# Patient Record
Sex: MALE | Race: WHITE | NOT HISPANIC OR LATINO | Employment: OTHER | URBAN - METROPOLITAN AREA
[De-identification: names, ages, dates, MRNs, and addresses within clinical notes are randomized per-mention and may not be internally consistent; named-entity substitution may affect disease eponyms.]

---

## 2017-05-16 ENCOUNTER — TRANSCRIBE ORDERS (OUTPATIENT)
Dept: ADMINISTRATIVE | Facility: HOSPITAL | Age: 82
End: 2017-05-16

## 2017-05-16 DIAGNOSIS — I63.9 IMPENDING CEREBROVASCULAR ACCIDENT (HCC): Primary | ICD-10-CM

## 2017-05-19 ENCOUNTER — HOSPITAL ENCOUNTER (OUTPATIENT)
Dept: NON INVASIVE DIAGNOSTICS | Facility: CLINIC | Age: 82
Discharge: HOME | End: 2017-05-19
Payer: MEDICARE

## 2017-05-19 DIAGNOSIS — I63.9 IMPENDING CEREBROVASCULAR ACCIDENT (HCC): ICD-10-CM

## 2017-05-19 PROCEDURE — 93880 EXTRACRANIAL BILAT STUDY: CPT

## 2017-05-23 ENCOUNTER — HOSPITAL ENCOUNTER (OUTPATIENT)
Dept: RADIOLOGY | Age: 82
Discharge: HOME | End: 2017-05-23
Payer: MEDICARE

## 2017-05-23 DIAGNOSIS — I63.9 IMPENDING CEREBROVASCULAR ACCIDENT (HCC): ICD-10-CM

## 2017-05-23 PROCEDURE — 70470 CT HEAD/BRAIN W/O & W/DYE: CPT

## 2017-05-23 RX ADMIN — IOHEXOL 100 ML: 350 INJECTION, SOLUTION INTRAVENOUS at 10:42

## 2019-02-06 ENCOUNTER — OFFICE VISIT (OUTPATIENT)
Dept: CARDIOLOGY CLINIC | Facility: CLINIC | Age: 84
End: 2019-02-06
Payer: MEDICARE

## 2019-02-06 VITALS
HEIGHT: 69 IN | BODY MASS INDEX: 24.85 KG/M2 | SYSTOLIC BLOOD PRESSURE: 130 MMHG | DIASTOLIC BLOOD PRESSURE: 80 MMHG | RESPIRATION RATE: 18 BRPM | WEIGHT: 167.8 LBS | HEART RATE: 75 BPM

## 2019-02-06 DIAGNOSIS — I10 ESSENTIAL HYPERTENSION: Primary | ICD-10-CM

## 2019-02-06 DIAGNOSIS — I49.3 VENTRICULAR PREMATURE DEPOLARIZATION: ICD-10-CM

## 2019-02-06 PROCEDURE — 99213 OFFICE O/P EST LOW 20 MIN: CPT | Performed by: INTERNAL MEDICINE

## 2019-02-06 RX ORDER — CLOPIDOGREL BISULFATE 75 MG/1
75 TABLET ORAL
COMMUNITY
Start: 2018-09-26 | End: 2019-12-17 | Stop reason: SDUPTHER

## 2019-02-06 RX ORDER — TOBRAMYCIN AND DEXAMETHASONE 3; 1 MG/ML; MG/ML
SUSPENSION/ DROPS OPHTHALMIC
Refills: 0 | COMMUNITY
Start: 2019-02-04 | End: 2019-12-17 | Stop reason: ALTCHOICE

## 2019-02-06 RX ORDER — ATENOLOL 50 MG/1
50 TABLET ORAL DAILY
COMMUNITY
Start: 2018-09-26

## 2019-02-06 RX ORDER — RANITIDINE 300 MG/1
300 TABLET ORAL
COMMUNITY
Start: 2018-05-18 | End: 2022-07-28

## 2019-02-06 RX ORDER — TAMSULOSIN HYDROCHLORIDE 0.4 MG/1
0.4 CAPSULE ORAL
COMMUNITY
Start: 2016-02-29 | End: 2022-08-02

## 2019-02-06 NOTE — LETTER
February 6, 2019     Victorino Harp MD  [address] [place] [address],[address] [facility]    Patient: Shawnee Gaston   YOB: 1927   Date of Visit: 2/6/2019       Dear Dr Pablo Pitts:    Thank you for referring Philippe Campbell to me for evaluation  Below are my notes for this consultation  If you have questions, please do not hesitate to call me  I look forward to following your patient along with you  Sincerely,        Xochitl Olivier MD        CC: No Recipients  Xochitl Olivier MD  2/6/2019 12:08 PM  Sign at close encounter  Assessment/Plan:    Essential hypertension  Hypertension, stable  We will continue the same medications  Ventricular premature depolarization  Patient has asymptomatic cardiac arrhythmia in the form of premature ventricular contractions and premature atrial contractions  Both are stable  We will continue atenolol  Diagnoses and all orders for this visit:    Essential hypertension    Ventricular premature depolarization    Other orders  -     atenolol (TENORMIN) 50 mg tablet; Take 50 mg by mouth  -     metFORMIN (GLUCOPHAGE) 500 mg tablet; Take 500 mg by mouth  -     clopidogrel (PLAVIX) 75 mg tablet; Take 75 mg by mouth  -     tamsulosin (FLOMAX) 0 4 mg; 0 8 mg  -     ranitidine (ZANTAC) 300 MG tablet; Take 300 mg by mouth  -     tobramycin-dexamethasone (TOBRADEX) ophthalmic suspension; INSTILL 1 DROP 4 TIMES A DAY IN BOTH EYES FOR 1 WEEK, THEN 1 DROP  Rhett Goody    (REFER TO PRESCRIPTION NOTES)  Subjective:  Feels well from the cardiac standpoint  Patient ID: Shawnee Gaston is a 80 y o  male  The patient presented to this office for the purpose of cardiac follow-up  He has a history of prior stroke as well as hypertension and diabetes mellitus  He is also known to have cardiac arrhythmia in the form of PACs and PVCs    He is feeling rather well from the cardiac standpoint denying any symptoms of chest pain, shortness of breath, palpitation, dizziness or lightheadedness  He has no leg edema  The following portions of the patient's history were reviewed and updated as appropriate: allergies, current medications, past family history, past medical history, past social history, past surgical history and problem list     Review of Systems   Respiratory: Negative for apnea, cough, chest tightness, shortness of breath and wheezing  Cardiovascular: Negative for chest pain, palpitations and leg swelling  Gastrointestinal: Negative for abdominal pain  Neurological: Negative for dizziness and light-headedness  Objective:  Stable cardiac-wise  /80 (BP Location: Right arm, Patient Position: Sitting)   Pulse 75   Resp 18   Ht 5' 9" (1 753 m)   Wt 76 1 kg (167 lb 12 8 oz)   BMI 24 78 kg/m²           Physical Exam   Constitutional: He is oriented to person, place, and time  He appears well-developed and well-nourished  No distress  HENT:   Head: Normocephalic  Eyes: Pupils are equal, round, and reactive to light  Neck: Normal range of motion  No JVD present  No thyromegaly present  Cardiovascular: Normal rate, regular rhythm, S1 normal and S2 normal   Exam reveals no gallop and no friction rub  Murmur heard  Crescendo systolic murmur is present with a grade of 2/6   Pulmonary/Chest: Effort normal and breath sounds normal  No respiratory distress  He has no wheezes  He has no rales  He exhibits no tenderness  Abdominal: Soft  Musculoskeletal: Normal range of motion  He exhibits no edema, tenderness or deformity  Neurological: He is alert and oriented to person, place, and time  Skin: Skin is warm and dry  He is not diaphoretic  Psychiatric: He has a normal mood and affect  Vitals reviewed

## 2019-02-06 NOTE — ASSESSMENT & PLAN NOTE
Patient has asymptomatic cardiac arrhythmia in the form of premature ventricular contractions and premature atrial contractions  Both are stable  We will continue atenolol

## 2019-02-06 NOTE — PROGRESS NOTES
Assessment/Plan:    Essential hypertension  Hypertension, stable  We will continue the same medications  Ventricular premature depolarization  Patient has asymptomatic cardiac arrhythmia in the form of premature ventricular contractions and premature atrial contractions  Both are stable  We will continue atenolol  Diagnoses and all orders for this visit:    Essential hypertension    Ventricular premature depolarization    Other orders  -     atenolol (TENORMIN) 50 mg tablet; Take 50 mg by mouth  -     metFORMIN (GLUCOPHAGE) 500 mg tablet; Take 500 mg by mouth  -     clopidogrel (PLAVIX) 75 mg tablet; Take 75 mg by mouth  -     tamsulosin (FLOMAX) 0 4 mg; 0 8 mg  -     ranitidine (ZANTAC) 300 MG tablet; Take 300 mg by mouth  -     tobramycin-dexamethasone (TOBRADEX) ophthalmic suspension; INSTILL 1 DROP 4 TIMES A DAY IN BOTH EYES FOR 1 WEEK, THEN 1 DROP  Kailee Lacy    (REFER TO PRESCRIPTION NOTES)  Subjective:  Feels well from the cardiac standpoint  Patient ID: Gurmeet Brooks is a 80 y o  male  The patient presented to this office for the purpose of cardiac follow-up  He has a history of prior stroke as well as hypertension and diabetes mellitus  He is also known to have cardiac arrhythmia in the form of PACs and PVCs  He is feeling rather well from the cardiac standpoint denying any symptoms of chest pain, shortness of breath, palpitation, dizziness or lightheadedness  He has no leg edema  The following portions of the patient's history were reviewed and updated as appropriate: allergies, current medications, past family history, past medical history, past social history, past surgical history and problem list     Review of Systems   Respiratory: Negative for apnea, cough, chest tightness, shortness of breath and wheezing  Cardiovascular: Negative for chest pain, palpitations and leg swelling  Gastrointestinal: Negative for abdominal pain     Neurological: Negative for dizziness and light-headedness  Objective:  Stable cardiac-wise  /80 (BP Location: Right arm, Patient Position: Sitting)   Pulse 75   Resp 18   Ht 5' 9" (1 753 m)   Wt 76 1 kg (167 lb 12 8 oz)   BMI 24 78 kg/m²          Physical Exam   Constitutional: He is oriented to person, place, and time  He appears well-developed and well-nourished  No distress  HENT:   Head: Normocephalic  Eyes: Pupils are equal, round, and reactive to light  Neck: Normal range of motion  No JVD present  No thyromegaly present  Cardiovascular: Normal rate, regular rhythm, S1 normal and S2 normal   Exam reveals no gallop and no friction rub  Murmur heard  Crescendo systolic murmur is present with a grade of 2/6   Pulmonary/Chest: Effort normal and breath sounds normal  No respiratory distress  He has no wheezes  He has no rales  He exhibits no tenderness  Abdominal: Soft  Musculoskeletal: Normal range of motion  He exhibits no edema, tenderness or deformity  Neurological: He is alert and oriented to person, place, and time  Skin: Skin is warm and dry  He is not diaphoretic  Psychiatric: He has a normal mood and affect  Vitals reviewed

## 2019-02-26 PROBLEM — H61.23 BILATERAL IMPACTED CERUMEN: Status: ACTIVE | Noted: 2019-02-26

## 2019-06-19 ENCOUNTER — OFFICE VISIT (OUTPATIENT)
Dept: CARDIOLOGY CLINIC | Facility: CLINIC | Age: 84
End: 2019-06-19
Payer: MEDICARE

## 2019-06-19 VITALS
HEIGHT: 69 IN | DIASTOLIC BLOOD PRESSURE: 75 MMHG | HEART RATE: 70 BPM | BODY MASS INDEX: 25.42 KG/M2 | SYSTOLIC BLOOD PRESSURE: 130 MMHG | WEIGHT: 171.6 LBS | RESPIRATION RATE: 18 BRPM

## 2019-06-19 DIAGNOSIS — I49.3 VENTRICULAR PREMATURE DEPOLARIZATION: ICD-10-CM

## 2019-06-19 DIAGNOSIS — I10 ESSENTIAL HYPERTENSION: Primary | ICD-10-CM

## 2019-06-19 PROCEDURE — 99213 OFFICE O/P EST LOW 20 MIN: CPT | Performed by: INTERNAL MEDICINE

## 2019-08-06 PROBLEM — H61.23 BILATERAL IMPACTED CERUMEN: Status: RESOLVED | Noted: 2019-02-26 | Resolved: 2019-08-06

## 2019-12-17 ENCOUNTER — OFFICE VISIT (OUTPATIENT)
Dept: CARDIOLOGY CLINIC | Facility: CLINIC | Age: 84
End: 2019-12-17
Payer: MEDICARE

## 2019-12-17 VITALS
HEIGHT: 69 IN | WEIGHT: 173.4 LBS | RESPIRATION RATE: 18 BRPM | SYSTOLIC BLOOD PRESSURE: 130 MMHG | DIASTOLIC BLOOD PRESSURE: 80 MMHG | BODY MASS INDEX: 25.68 KG/M2 | HEART RATE: 70 BPM | OXYGEN SATURATION: 97 %

## 2019-12-17 DIAGNOSIS — I10 ESSENTIAL HYPERTENSION: Primary | ICD-10-CM

## 2019-12-17 DIAGNOSIS — I49.3 VENTRICULAR PREMATURE DEPOLARIZATION: ICD-10-CM

## 2019-12-17 PROCEDURE — 99213 OFFICE O/P EST LOW 20 MIN: CPT | Performed by: INTERNAL MEDICINE

## 2019-12-17 RX ORDER — FINASTERIDE 5 MG/1
5 TABLET, FILM COATED ORAL DAILY
COMMUNITY
Start: 2019-10-24

## 2019-12-17 RX ORDER — CLOPIDOGREL BISULFATE 75 MG/1
TABLET ORAL
COMMUNITY
Start: 2019-11-16 | End: 2022-07-28

## 2019-12-17 NOTE — LETTER
December 17, 2019     Lynette Steen MD  [address] [place] [address]    Patient: Delaney Meng   YOB: 1927   Date of Visit: 12/17/2019       Dear Dr Jean Crowder:    Thank you for referring Coty Kruse to me for evaluation  Below are my notes for this consultation  If you have questions, please do not hesitate to call me  I look forward to following your patient along with you  Sincerely,        Chris Zapata MD        CC: No Recipients  Chris Zapata MD  12/17/2019  9:35 AM  Sign at close encounter  Assessment/Plan:    Essential hypertension  Hypertension, stable and adequately controlled  Ventricular premature depolarization  History of premature ventricular contractions  This has been stable  The patient is asymptomatic  We will continue observation  Diagnoses and all orders for this visit:    Essential hypertension    Ventricular premature depolarization    Other orders  -     clopidogrel (PLAVIX) 75 mg tablet  -     finasteride (PROSCAR) 5 mg tablet          Subjective:  Feels well  Patient ID: Delaney Meng is a 80 y o  male  The patient presented to this office for the purpose of cardiac follow-up  He has a known history of hypertension and premature ventricular contractions  He had a prior history of CVA  He is somewhat unsteady on his feet but he has managed to avoid falling  He is using an assistive device  The patient denies any symptom of chest pain, shortness of breath, palpitation, dizziness or lightheadedness  He has no leg edema  The following portions of the patient's history were reviewed and updated as appropriate: allergies, current medications, past family history, past medical history, past social history, past surgical history and problem list     Review of Systems   Respiratory: Negative for apnea, cough, chest tightness, shortness of breath and wheezing      Cardiovascular: Negative for chest pain, palpitations and leg swelling  Gastrointestinal: Negative for abdominal pain  Neurological: Negative for dizziness and light-headedness  Hematological: Negative  Psychiatric/Behavioral: Negative  Objective:  Stable cardiac-wise  /80 (BP Location: Right arm, Patient Position: Sitting)   Pulse 70   Resp 18   Ht 5' 9" (1 753 m)   Wt 78 7 kg (173 lb 6 4 oz)   SpO2 97%   BMI 25 61 kg/m²           Physical Exam   Constitutional: He is oriented to person, place, and time  He appears well-developed and well-nourished  No distress  HENT:   Head: Normocephalic  Eyes: Pupils are equal, round, and reactive to light  Neck: Normal range of motion  No JVD present  No thyromegaly present  Cardiovascular: Normal rate, regular rhythm, S1 normal and S2 normal  Exam reveals no gallop and no friction rub  Murmur heard  Crescendo systolic murmur is present with a grade of 2/6  Pulmonary/Chest: Effort normal and breath sounds normal  No respiratory distress  He has no wheezes  He has no rales  He exhibits no tenderness  Abdominal: Soft  Musculoskeletal: Normal range of motion  He exhibits no edema, tenderness or deformity  Neurological: He is alert and oriented to person, place, and time  Skin: Skin is warm and dry  He is not diaphoretic  Psychiatric: He has a normal mood and affect  Vitals reviewed

## 2019-12-17 NOTE — ASSESSMENT & PLAN NOTE
History of premature ventricular contractions  This has been stable  The patient is asymptomatic  We will continue observation

## 2019-12-17 NOTE — PROGRESS NOTES
Assessment/Plan:    Essential hypertension  Hypertension, stable and adequately controlled  Ventricular premature depolarization  History of premature ventricular contractions  This has been stable  The patient is asymptomatic  We will continue observation  Diagnoses and all orders for this visit:    Essential hypertension    Ventricular premature depolarization    Other orders  -     clopidogrel (PLAVIX) 75 mg tablet  -     finasteride (PROSCAR) 5 mg tablet          Subjective:  Feels well  Patient ID: Delvis Grullon is a 80 y o  male  The patient presented to this office for the purpose of cardiac follow-up  He has a known history of hypertension and premature ventricular contractions  He had a prior history of CVA  He is somewhat unsteady on his feet but he has managed to avoid falling  He is using an assistive device  The patient denies any symptom of chest pain, shortness of breath, palpitation, dizziness or lightheadedness  He has no leg edema  The following portions of the patient's history were reviewed and updated as appropriate: allergies, current medications, past family history, past medical history, past social history, past surgical history and problem list     Review of Systems   Respiratory: Negative for apnea, cough, chest tightness, shortness of breath and wheezing  Cardiovascular: Negative for chest pain, palpitations and leg swelling  Gastrointestinal: Negative for abdominal pain  Neurological: Negative for dizziness and light-headedness  Hematological: Negative  Psychiatric/Behavioral: Negative  Objective:  Stable cardiac-wise  /80 (BP Location: Right arm, Patient Position: Sitting)   Pulse 70   Resp 18   Ht 5' 9" (1 753 m)   Wt 78 7 kg (173 lb 6 4 oz)   SpO2 97%   BMI 25 61 kg/m²          Physical Exam   Constitutional: He is oriented to person, place, and time  He appears well-developed and well-nourished  No distress     HENT: Head: Normocephalic  Eyes: Pupils are equal, round, and reactive to light  Neck: Normal range of motion  No JVD present  No thyromegaly present  Cardiovascular: Normal rate, regular rhythm, S1 normal and S2 normal  Exam reveals no gallop and no friction rub  Murmur heard  Crescendo systolic murmur is present with a grade of 2/6  Pulmonary/Chest: Effort normal and breath sounds normal  No respiratory distress  He has no wheezes  He has no rales  He exhibits no tenderness  Abdominal: Soft  Musculoskeletal: Normal range of motion  He exhibits no edema, tenderness or deformity  Neurological: He is alert and oriented to person, place, and time  Skin: Skin is warm and dry  He is not diaphoretic  Psychiatric: He has a normal mood and affect  Vitals reviewed

## 2020-05-01 ENCOUNTER — TELEPHONE (OUTPATIENT)
Dept: UROLOGY | Facility: AMBULATORY SURGERY CENTER | Age: 85
End: 2020-05-01

## 2020-05-12 ENCOUNTER — NURSE TRIAGE (OUTPATIENT)
Dept: OTHER | Facility: OTHER | Age: 85
End: 2020-05-12

## 2020-06-23 ENCOUNTER — OFFICE VISIT (OUTPATIENT)
Dept: CARDIOLOGY CLINIC | Facility: CLINIC | Age: 85
End: 2020-06-23
Payer: MEDICARE

## 2020-06-23 VITALS
HEART RATE: 70 BPM | BODY MASS INDEX: 24.65 KG/M2 | RESPIRATION RATE: 18 BRPM | SYSTOLIC BLOOD PRESSURE: 135 MMHG | OXYGEN SATURATION: 98 % | HEIGHT: 69 IN | DIASTOLIC BLOOD PRESSURE: 80 MMHG | WEIGHT: 166.4 LBS

## 2020-06-23 DIAGNOSIS — I10 ESSENTIAL HYPERTENSION: Primary | ICD-10-CM

## 2020-06-23 DIAGNOSIS — I49.3 VENTRICULAR PREMATURE DEPOLARIZATION: ICD-10-CM

## 2020-06-23 PROCEDURE — 99213 OFFICE O/P EST LOW 20 MIN: CPT | Performed by: INTERNAL MEDICINE

## 2021-01-23 ENCOUNTER — IMMUNIZATIONS (OUTPATIENT)
Dept: FAMILY MEDICINE CLINIC | Facility: HOSPITAL | Age: 86
End: 2021-01-23

## 2021-01-23 DIAGNOSIS — Z23 ENCOUNTER FOR IMMUNIZATION: Primary | ICD-10-CM

## 2021-01-23 PROCEDURE — 0001A SARS-COV-2 / COVID-19 MRNA VACCINE (PFIZER-BIONTECH) 30 MCG: CPT

## 2021-01-23 PROCEDURE — 91300 SARS-COV-2 / COVID-19 MRNA VACCINE (PFIZER-BIONTECH) 30 MCG: CPT

## 2021-02-13 ENCOUNTER — IMMUNIZATIONS (OUTPATIENT)
Dept: FAMILY MEDICINE CLINIC | Facility: HOSPITAL | Age: 86
End: 2021-02-13

## 2021-02-13 DIAGNOSIS — Z23 ENCOUNTER FOR IMMUNIZATION: Primary | ICD-10-CM

## 2021-02-13 PROCEDURE — 0002A SARS-COV-2 / COVID-19 MRNA VACCINE (PFIZER-BIONTECH) 30 MCG: CPT

## 2021-02-13 PROCEDURE — 91300 SARS-COV-2 / COVID-19 MRNA VACCINE (PFIZER-BIONTECH) 30 MCG: CPT

## 2021-04-15 ENCOUNTER — LAB REQUISITION (OUTPATIENT)
Dept: LAB | Facility: HOSPITAL | Age: 86
End: 2021-04-15
Payer: MEDICARE

## 2021-04-15 DIAGNOSIS — N40.1 BENIGN PROSTATIC HYPERPLASIA WITH LOWER URINARY TRACT SYMPTOMS: ICD-10-CM

## 2021-04-15 DIAGNOSIS — N28.1 CYST OF KIDNEY, ACQUIRED: ICD-10-CM

## 2021-04-15 DIAGNOSIS — R31.9 HEMATURIA, UNSPECIFIED: ICD-10-CM

## 2021-04-15 PROCEDURE — 88112 CYTOPATH CELL ENHANCE TECH: CPT | Performed by: PATHOLOGY

## 2021-04-15 PROCEDURE — 87086 URINE CULTURE/COLONY COUNT: CPT | Performed by: UROLOGY

## 2021-04-17 LAB — BACTERIA UR CULT: NORMAL

## 2021-09-28 ENCOUNTER — IMMUNIZATIONS (OUTPATIENT)
Dept: FAMILY MEDICINE CLINIC | Facility: HOSPITAL | Age: 86
End: 2021-09-28

## 2021-09-28 DIAGNOSIS — Z23 ENCOUNTER FOR IMMUNIZATION: Primary | ICD-10-CM

## 2021-09-28 PROCEDURE — 91300 SARS-COV-2 / COVID-19 MRNA VACCINE (PFIZER-BIONTECH) 30 MCG: CPT

## 2021-09-28 PROCEDURE — 0001A SARS-COV-2 / COVID-19 MRNA VACCINE (PFIZER-BIONTECH) 30 MCG: CPT

## 2022-02-09 PROCEDURE — 87186 SC STD MICRODIL/AGAR DIL: CPT | Performed by: UROLOGY

## 2022-02-09 PROCEDURE — 87077 CULTURE AEROBIC IDENTIFY: CPT | Performed by: UROLOGY

## 2022-02-09 PROCEDURE — 87086 URINE CULTURE/COLONY COUNT: CPT | Performed by: UROLOGY

## 2022-02-10 ENCOUNTER — LAB REQUISITION (OUTPATIENT)
Dept: LAB | Facility: HOSPITAL | Age: 87
End: 2022-02-10
Payer: MEDICARE

## 2022-02-10 DIAGNOSIS — N39.0 URINARY TRACT INFECTION, SITE NOT SPECIFIED: ICD-10-CM

## 2022-02-12 LAB
BACTERIA UR CULT: ABNORMAL
BACTERIA UR CULT: ABNORMAL

## 2022-05-31 ENCOUNTER — APPOINTMENT (OUTPATIENT)
Dept: LAB | Facility: HOSPITAL | Age: 87
End: 2022-05-31
Attending: UROLOGY
Payer: MEDICARE

## 2022-05-31 DIAGNOSIS — N39.0 URINARY TRACT INFECTION WITHOUT HEMATURIA, SITE UNSPECIFIED: ICD-10-CM

## 2022-05-31 PROCEDURE — 87077 CULTURE AEROBIC IDENTIFY: CPT

## 2022-05-31 PROCEDURE — 87186 SC STD MICRODIL/AGAR DIL: CPT

## 2022-05-31 PROCEDURE — 87086 URINE CULTURE/COLONY COUNT: CPT

## 2022-06-01 ENCOUNTER — HOSPITAL ENCOUNTER (OUTPATIENT)
Dept: RADIOLOGY | Facility: HOSPITAL | Age: 87
Discharge: HOME | End: 2022-06-01
Attending: UROLOGY

## 2022-06-01 DIAGNOSIS — N13.8 ENLARGED PROSTATE WITH URINARY OBSTRUCTION: ICD-10-CM

## 2022-06-01 DIAGNOSIS — N40.1 ENLARGED PROSTATE WITH URINARY OBSTRUCTION: ICD-10-CM

## 2022-06-01 DIAGNOSIS — N28.1 ACQUIRED CYST OF KIDNEY: ICD-10-CM

## 2022-06-02 LAB
BACTERIA UR CULT: ABNORMAL
BACTERIA UR CULT: ABNORMAL

## 2022-06-06 ENCOUNTER — TELEPHONE (OUTPATIENT)
Dept: UROLOGY | Facility: AMBULATORY SURGERY CENTER | Age: 87
End: 2022-06-06

## 2022-06-06 ENCOUNTER — HOSPITAL ENCOUNTER (OUTPATIENT)
Dept: RADIOLOGY | Age: 87
Discharge: HOME | End: 2022-06-06
Attending: UROLOGY
Payer: MEDICARE

## 2022-06-06 PROCEDURE — 76770 US EXAM ABDO BACK WALL COMP: CPT

## 2022-06-06 NOTE — TELEPHONE ENCOUNTER
Radiology Test Results - Radiology Calling with report update    Pt under care of: Dr Ivan Martinez Findings - Please review      Radiology stated Dr Jose Armando Beyer is out of office today and to forward calls to [facility] Urology    Call back-Sherie from Radiology [phone] option 3

## 2022-06-06 NOTE — TELEPHONE ENCOUNTER
Hydronephrosis, BPH, and bladder nodule noted on imaging  Unable to review records of patient's urologic history  Unsure of next appointment with Dr Dennis Rolle  Please call patient to follow up

## 2022-06-09 ENCOUNTER — APPOINTMENT (OUTPATIENT)
Dept: LAB | Facility: HOSPITAL | Age: 87
End: 2022-06-09
Attending: UROLOGY
Payer: MEDICARE

## 2022-06-09 DIAGNOSIS — N40.1 ENLARGED PROSTATE WITH URINARY OBSTRUCTION: ICD-10-CM

## 2022-06-09 DIAGNOSIS — N13.8 ENLARGED PROSTATE WITH URINARY OBSTRUCTION: ICD-10-CM

## 2022-06-09 DIAGNOSIS — C61 MALIGNANT NEOPLASM OF PROSTATE (HCC): ICD-10-CM

## 2022-06-09 PROCEDURE — 87086 URINE CULTURE/COLONY COUNT: CPT

## 2022-06-09 PROCEDURE — 87077 CULTURE AEROBIC IDENTIFY: CPT

## 2022-06-09 PROCEDURE — 87186 SC STD MICRODIL/AGAR DIL: CPT

## 2022-06-11 LAB
BACTERIA UR CULT: ABNORMAL

## 2022-06-22 ENCOUNTER — HOSPITAL ENCOUNTER (OUTPATIENT)
Dept: RADIOLOGY | Age: 87
Discharge: HOME | End: 2022-06-22
Payer: MEDICARE

## 2022-06-22 DIAGNOSIS — N13.30 HYDRONEPHROSIS, UNSPECIFIED HYDRONEPHROSIS TYPE: ICD-10-CM

## 2022-06-22 PROCEDURE — 76770 US EXAM ABDO BACK WALL COMP: CPT

## 2022-06-27 ENCOUNTER — TELEPHONE (OUTPATIENT)
Dept: OTHER | Facility: OTHER | Age: 87
End: 2022-06-27

## 2022-06-27 NOTE — TELEPHONE ENCOUNTER
Pt's wife called in stating pt pulled his catheter out partially and it's not functioning  She is requesting a call back

## 2022-06-27 NOTE — TELEPHONE ENCOUNTER
Returned call to patient wife advised they should take him to Emergency Department for evaluation  Patient wife states they are there now and thanked our office for the call back

## 2022-07-19 ENCOUNTER — APPOINTMENT (OUTPATIENT)
Dept: RADIOLOGY | Age: 87
End: 2022-07-19
Payer: MEDICARE

## 2022-07-19 ENCOUNTER — TRANSCRIBE ORDERS (OUTPATIENT)
Dept: ADMINISTRATIVE | Age: 87
End: 2022-07-19
Payer: MEDICARE

## 2022-07-19 ENCOUNTER — APPOINTMENT (OUTPATIENT)
Dept: LAB | Age: 87
End: 2022-07-19
Payer: MEDICARE

## 2022-07-19 DIAGNOSIS — C67.9 MALIGNANT NEOPLASM OF URINARY BLADDER, UNSPECIFIED SITE (HCC): ICD-10-CM

## 2022-07-19 DIAGNOSIS — C67.9 MALIGNANT NEOPLASM OF URINARY BLADDER, UNSPECIFIED SITE (HCC): Primary | ICD-10-CM

## 2022-07-19 LAB
ABO GROUP BLD: NORMAL
ALBUMIN SERPL BCP-MCNC: 3.3 G/DL (ref 3.5–5)
ALP SERPL-CCNC: 137 U/L (ref 46–116)
ALT SERPL W P-5'-P-CCNC: 48 U/L (ref 12–78)
ANION GAP SERPL CALCULATED.3IONS-SCNC: 7 MMOL/L (ref 4–13)
AST SERPL W P-5'-P-CCNC: 31 U/L (ref 5–45)
BASOPHILS # BLD AUTO: 0.12 THOUSANDS/ΜL (ref 0–0.1)
BASOPHILS NFR BLD AUTO: 1 % (ref 0–1)
BILIRUB SERPL-MCNC: 0.32 MG/DL (ref 0.2–1)
BLD GP AB SCN SERPL QL: NEGATIVE
BUN SERPL-MCNC: 27 MG/DL (ref 5–25)
CALCIUM ALBUM COR SERPL-MCNC: 9.8 MG/DL (ref 8.3–10.1)
CALCIUM SERPL-MCNC: 9.2 MG/DL (ref 8.3–10.1)
CHLORIDE SERPL-SCNC: 103 MMOL/L (ref 96–108)
CO2 SERPL-SCNC: 24 MMOL/L (ref 21–32)
CREAT SERPL-MCNC: 1.63 MG/DL (ref 0.6–1.3)
EOSINOPHIL # BLD AUTO: 0.13 THOUSAND/ΜL (ref 0–0.61)
EOSINOPHIL NFR BLD AUTO: 1 % (ref 0–6)
ERYTHROCYTE [DISTWIDTH] IN BLOOD BY AUTOMATED COUNT: 17.9 % (ref 11.6–15.1)
GFR SERPL CREATININE-BSD FRML MDRD: 35 ML/MIN/1.73SQ M
GLUCOSE SERPL-MCNC: 338 MG/DL (ref 65–140)
HCT VFR BLD AUTO: 37.8 % (ref 36.5–49.3)
HGB BLD-MCNC: 11.3 G/DL (ref 12–17)
IMM GRANULOCYTES # BLD AUTO: 0.09 THOUSAND/UL (ref 0–0.2)
IMM GRANULOCYTES NFR BLD AUTO: 1 % (ref 0–2)
LYMPHOCYTES # BLD AUTO: 2.17 THOUSANDS/ΜL (ref 0.6–4.47)
LYMPHOCYTES NFR BLD AUTO: 22 % (ref 14–44)
MCH RBC QN AUTO: 24.4 PG (ref 26.8–34.3)
MCHC RBC AUTO-ENTMCNC: 29.9 G/DL (ref 31.4–37.4)
MCV RBC AUTO: 82 FL (ref 82–98)
MONOCYTES # BLD AUTO: 0.47 THOUSAND/ΜL (ref 0.17–1.22)
MONOCYTES NFR BLD AUTO: 5 % (ref 4–12)
NEUTROPHILS # BLD AUTO: 6.73 THOUSANDS/ΜL (ref 1.85–7.62)
NEUTS SEG NFR BLD AUTO: 70 % (ref 43–75)
NRBC BLD AUTO-RTO: 0 /100 WBCS
PLATELET # BLD AUTO: 174 THOUSANDS/UL (ref 149–390)
PMV BLD AUTO: 11.1 FL (ref 8.9–12.7)
POTASSIUM SERPL-SCNC: 4.4 MMOL/L (ref 3.5–5.3)
PROT SERPL-MCNC: 7.5 G/DL (ref 6.4–8.4)
RBC # BLD AUTO: 4.64 MILLION/UL (ref 3.88–5.62)
RH BLD: POSITIVE
SODIUM SERPL-SCNC: 134 MMOL/L (ref 135–147)
SPECIMEN EXPIRATION DATE: NORMAL
WBC # BLD AUTO: 9.71 THOUSAND/UL (ref 4.31–10.16)

## 2022-07-19 PROCEDURE — 80053 COMPREHEN METABOLIC PANEL: CPT

## 2022-07-19 PROCEDURE — 71046 X-RAY EXAM CHEST 2 VIEWS: CPT

## 2022-07-19 PROCEDURE — 36415 COLL VENOUS BLD VENIPUNCTURE: CPT

## 2022-07-19 PROCEDURE — 93005 ELECTROCARDIOGRAM TRACING: CPT

## 2022-07-19 PROCEDURE — 86850 RBC ANTIBODY SCREEN: CPT

## 2022-07-19 PROCEDURE — 85025 COMPLETE CBC W/AUTO DIFF WBC: CPT

## 2022-07-19 PROCEDURE — 86901 BLOOD TYPING SEROLOGIC RH(D): CPT

## 2022-07-19 PROCEDURE — 86900 BLOOD TYPING SEROLOGIC ABO: CPT

## 2022-07-20 LAB
ATRIAL RATE: 71 BPM
P AXIS: 47 DEGREES
PR INTERVAL: 250 MS
QRS AXIS: 0 DEGREES
QRSD INTERVAL: 82 MS
QT INTERVAL: 402 MS
QTC INTERVAL: 436 MS
T WAVE AXIS: 41 DEGREES
VENTRICULAR RATE: 71 BPM

## 2022-07-20 PROCEDURE — 93010 ELECTROCARDIOGRAM REPORT: CPT | Performed by: HOSPITALIST

## 2022-07-22 ENCOUNTER — HOSPITAL ENCOUNTER (EMERGENCY)
Facility: HOSPITAL | Age: 87
Discharge: HOME | End: 2022-07-22
Attending: EMERGENCY MEDICINE | Admitting: EMERGENCY MEDICINE
Payer: MEDICARE

## 2022-07-22 VITALS
OXYGEN SATURATION: 96 % | RESPIRATION RATE: 18 BRPM | HEART RATE: 83 BPM | SYSTOLIC BLOOD PRESSURE: 126 MMHG | DIASTOLIC BLOOD PRESSURE: 56 MMHG | TEMPERATURE: 98.2 F

## 2022-07-22 DIAGNOSIS — R33.9 URINARY RETENTION: Primary | ICD-10-CM

## 2022-07-22 PROCEDURE — 99283 EMERGENCY DEPT VISIT LOW MDM: CPT

## 2022-07-22 PROCEDURE — 99284 EMERGENCY DEPT VISIT MOD MDM: CPT | Performed by: EMERGENCY MEDICINE

## 2022-07-22 NOTE — ED NOTES
Pt arrived with 16Fr catheter in place with minimal drainage in bag  15 cc removed from balloon and catheter removed  New catheter placed as documented        [id number] Mohit Williamson RN  07/22/22 6099

## 2022-07-22 NOTE — ED PROVIDER NOTES
History  Chief Complaint   Patient presents with    Difficulty Urinating     The patient reports no output from his chronic indwelling Kaplan catheter for about 5 hours  He has lower abdominal fullness and pain  He has had identical symptoms numerous times in the past due to obstructions of his urinary catheter  He does not have any supplies to drain it  He denies fevers or chills  He has chronic urethral burning that is unchanged  He also has chronic UTI that his management team has advised should not be treated unless he has significant symptoms  The patient's pain is severe and constant  His gradually been getting worse  No exacerbating or alleviating factors  Prior to Admission Medications   Prescriptions Last Dose Informant Patient Reported? Taking?   atenolol (TENORMIN) 50 mg tablet   Yes No   Sig: Take 50 mg by mouth   clopidogrel (PLAVIX) 75 mg tablet   Yes No   finasteride (PROSCAR) 5 mg tablet   Yes No   metFORMIN (GLUCOPHAGE) 500 mg tablet   Yes No   Sig: Take 500 mg by mouth 2 (two) times a day with meals    ranitidine (ZANTAC) 300 MG tablet   Yes No   Sig: Take 300 mg by mouth   sitaGLIPtin (JANUVIA) 50 mg tablet  Self Yes No   Sig: Take 50 mg by mouth daily   tamsulosin (FLOMAX) 0 4 mg   Yes No   Si 4 mg       Facility-Administered Medications: None       Past Medical History:   Diagnosis Date    Arrhythmia     Bilateral impacted cerumen 2019    CVA [facility]ce[facility]bral vascular accident) ([facility] [address]     [facility]     Hypertension     Murmur     Prostate disease     Stomach ulcer     Unsteadiness     Vertigo        Past Surgical History:   Procedure Laterality Date    CATARACT EXTRACTION, BILATERAL      SKIN CANCER EXCISION         Family History   Problem Relation Age of Onset    Heart failure Mother     Cancer Father      I have reviewed and agree with the history as documented      E-Cigarette/Vaping     E-Cigarette/Vaping Substances     Social History     Tobacco Use    Smoking status: Never Smoker    Smokeless tobacco: Never Used   Substance Use Topics    Alcohol use: No    Drug use: No       Review of Systems   All other systems reviewed and are negative  Physical Exam  Physical Exam  Vitals and nursing note reviewed  Constitutional:       Appearance: He is well-developed  HENT:      Head: Normocephalic and atraumatic  Eyes:      Conjunctiva/sclera: Conjunctivae normal    Cardiovascular:      Rate and Rhythm: Normal rate and regular rhythm  Heart sounds: No murmur heard  Pulmonary:      Effort: Pulmonary effort is normal  No respiratory distress  Breath sounds: Normal breath sounds  Abdominal:      Palpations: Abdomen is soft  Tenderness: There is no abdominal tenderness  Genitourinary:     Comments: Palpable distended urinary bladder consistent with urinary retention  Musculoskeletal:      Cervical back: Neck supple  Skin:     General: Skin is warm and dry  Neurological:      Mental Status: He is alert     Psychiatric:         Mood and Affect: Mood normal          Vital Signs  ED Triage Vitals   Temperature Pulse Respirations Blood Pressure SpO2   07/22/22 1714 07/22/22 1714 07/22/22 1714 07/22/22 1749 07/22/22 1714   98 2 °F (36 8 °C) 83 18 126/56 96 %      Temp Source Heart Rate Source Patient Position - Orthostatic VS BP Location FiO2 (%)   07/22/22 1714 07/22/22 1714 07/22/22 1714 07/22/22 1714 --   Oral Monitor Lying Right arm       Pain Score       --                  Vitals:    07/22/22 1714 07/22/22 1749   BP:  126/56   Pulse: 83    Patient Position - Orthostatic VS: Lying          Visual Acuity      ED Medications  Medications - No data to display    Diagnostic Studies  Results Reviewed     None                 No orders to display              Procedures  Procedures         ED Course  ED Course as of 07/22/22 1905 Fri Jul 22, 2022   1904 On re-evaluation after placement of Kaplan, patient reports complete resolution of the symptoms  Catheter is not draining with 800 mL of urine draining immediately  [facility]  Number of Diagnoses or Management Options  Urinary retention  Diagnosis management comments: Patient has obvious urinary tension on exam   Will replace Kaplan  Disposition  Final diagnoses:   Urinary retention     Time reflects when diagnosis was documented in both MDM as applicable and the Disposition within this note     Time User Action Codes Description Comment    7/22/2022  5:45 PM Omega Kent Add [R33 9] Urinary retention       ED Disposition     ED Disposition   Discharge    Condition   Stable    Date/Time   Fri Jul 22, 2022  5:45 PM    Comment   Phill Chaney discharge to home/self care  Follow-up Information     Follow up With Specialties Details Why Contact Info    Jose Miguel Long MD Urology   [address] [place] [address],[address]  [address] [facility]  [phone]            Discharge Medication List as of 7/22/2022  5:46 PM      CONTINUE these medications which have NOT CHANGED    Details   atenolol (TENORMIN) 50 mg tablet Take 50 mg by mouth, Starting Wed 9/26/2018, Historical Med      clopidogrel (PLAVIX) 75 mg tablet Starting Sat 11/16/2019, Historical Med      finasteride (PROSCAR) 5 mg tablet Starting Thu 10/24/2019, Historical Med      metFORMIN (GLUCOPHAGE) 500 mg tablet Take 500 mg by mouth 2 (two) times a day with meals , Starting Mon 1/7/2019, Historical Med      ranitidine (ZANTAC) 300 MG tablet Take 300 mg by mouth, Starting Fri 5/18/2018, Historical Med      sitaGLIPtin (JANUVIA) 50 mg tablet Take 50 mg by mouth daily, Historical Med      tamsulosin (FLOMAX) 0 4 mg 0 4 mg , Starting Mon 2/29/2016, Historical Med             No discharge procedures on file      PDMP Review     None          ED Provider  Electronically Signed by           Gordy Cruz MD  07/22/22 0568

## 2022-07-27 ENCOUNTER — ANESTHESIA EVENT (OUTPATIENT)
Dept: PERIOP | Facility: HOSPITAL | Age: 87
End: 2022-07-27
Payer: MEDICARE

## 2022-07-28 NOTE — PRE-PROCEDURE INSTRUCTIONS
Pre-Surgery Instructions:   Medication Instructions    atenolol (TENORMIN) 50 mg tablet Take day of surgery   ESOMEPRAZOLE MAGNESIUM PO Take day of surgery   finasteride (PROSCAR) 5 mg tablet Take day of surgery   metFORMIN (GLUCOPHAGE) 500 mg tablet Hold day of surgery   Multiple Vitamins-Minerals (CENTRUM SILVER ULTRA MENS PO) Stop taking today    tamsulosin (FLOMAX) 0 4 mg Take evening   Have you had / have a sore throat? No  Have you had / have a cough less than 1 week? No  Have you had / have a fever greater than 100 0 - 100  4? No  Are you experiencing any shortness of breath? No    Review with patient and his wife Jessica Caldera via phone medications and showering instructions  Instructed to avoid all ASA/NSAIDs and OTC Vit/Supp from now until after surgery per anesthesia guidelines  Tylenol ok prn  Advised ASC call with surgery schedule time, nothing eat or drink after midnight  Verbalized understanding

## 2022-07-29 NOTE — H&P
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 on studies and cysto  He is for a TUR bladder tumor  He will keep his Kaplan catheter  He is a patient of Dr Rin Phipps at [facility] who has medically cleared him for this procedure  He is currently on Proscar and Flomax, methamine Hippurate, Metformin, Lisinopril and Atenolol  He has renal cysts  He is allergic to penicillin, antiinflammatories and aspirin  Physical exam reveals gentleman slightly younger in appearance than stated age  His chest is clear to IPP&A  Heart normal sinus rhythm  No murmurs or megaly  No jaundice  Abdomen soft  Bowel sounds normal  BP is 132/84  Pulse is 76 and regular  98 6 temperature  HEENT exam physiologic  No jaundice  Chronic Kaplan catheter in  Prostate slightly enlarged  Extremities normal  CNA exam physiologic     For cysto TURBT

## 2022-07-31 PROBLEM — E11.9 DIABETES MELLITUS, TYPE 2 (HCC): Chronic | Status: ACTIVE | Noted: 2022-07-31

## 2022-07-31 NOTE — ANESTHESIA PREPROCEDURE EVALUATION
Procedure:  TRANSURETHRAL RESECTION OF BLADDER TUMOR (TURBT) (N/A Bladder)  CYSTOSCOPY (N/A Bladder)    Relevant Problems   CARDIO   (+) Essential hypertension   (+) Ventricular premature depolarization      ENDO   (+) Diabetes mellitus, type 2 (HCC)      /RENAL  bladder tumor      Recent labs personally reviewed:  Lab Results   Component Value Date    WBC 9 71 07/19/2022    HGB 11 3 (L) 07/19/2022     07/19/2022     Lab Results   Component Value Date    K 4 4 07/19/2022    BUN 27 (H) 07/19/2022    CREATININE 1 63 (H) 07/19/2022     No results found for: PTT   No results found for: INR    Lab Results   Component Value Date    HGBA1C 8 7 (H) 05/19/2022       Type and Screen:  O      Physical Exam    Airway    Mallampati score: III  TM Distance: >3 FB  Neck ROM: full     Dental   upper dentures and lower dentures,     Cardiovascular  Cardiovascular exam normal    Pulmonary  Pulmonary exam normal     Other Findings        Anesthesia Plan  ASA Score- 3     Anesthesia Type- general with ASA Monitors  Additional Monitors:   Airway Plan: LMA  Plan Factors-Exercise tolerance (METS): <4 METS  Chart reviewed  EKG reviewed  Patient summary reviewed  Patient is not a current smoker  Patient not instructed to abstain from smoking on day of procedure  Patient did not smoke on day of surgery  Obstructive sleep apnea risk education given perioperatively  Induction- intravenous  Postoperative Plan- Plan for postoperative opioid use  Planned trial extubation    Informed Consent- Anesthetic plan and risks discussed with patient  I personally reviewed this patient with the CRNA  Discussed and agreed on the Anesthesia Plan with the CRNA  Irma Shultz

## 2022-08-01 ENCOUNTER — ANESTHESIA (OUTPATIENT)
Dept: PERIOP | Facility: HOSPITAL | Age: 87
End: 2022-08-01
Payer: MEDICARE

## 2022-08-01 ENCOUNTER — HOSPITAL ENCOUNTER (OUTPATIENT)
Facility: HOSPITAL | Age: 87
Setting detail: OUTPATIENT SURGERY
Discharge: HOME | End: 2022-08-02
Attending: UROLOGY | Admitting: UROLOGY
Payer: MEDICARE

## 2022-08-01 DIAGNOSIS — C67.9 MALIGNANT NEOPLASM OF BLADDER, UNSPECIFIED (HCC): ICD-10-CM

## 2022-08-01 LAB
ABO GROUP BLD: NORMAL
BLD GP AB SCN SERPL QL: NEGATIVE
GLUCOSE SERPL-MCNC: 285 MG/DL (ref 65–140)
GLUCOSE SERPL-MCNC: 329 MG/DL (ref 65–140)
RH BLD: POSITIVE
SPECIMEN EXPIRATION DATE: NORMAL

## 2022-08-01 PROCEDURE — 88341 IMHCHEM/IMCYTCHM EA ADD ANTB: CPT | Performed by: PATHOLOGY

## 2022-08-01 PROCEDURE — 82948 REAGENT STRIP/BLOOD GLUCOSE: CPT

## 2022-08-01 PROCEDURE — 88307 TISSUE EXAM BY PATHOLOGIST: CPT | Performed by: PATHOLOGY

## 2022-08-01 PROCEDURE — 86901 BLOOD TYPING SEROLOGIC RH(D): CPT | Performed by: UROLOGY

## 2022-08-01 PROCEDURE — 86850 RBC ANTIBODY SCREEN: CPT | Performed by: UROLOGY

## 2022-08-01 PROCEDURE — 87186 SC STD MICRODIL/AGAR DIL: CPT | Performed by: UROLOGY

## 2022-08-01 PROCEDURE — 88342 IMHCHEM/IMCYTCHM 1ST ANTB: CPT | Performed by: PATHOLOGY

## 2022-08-01 PROCEDURE — 87086 URINE CULTURE/COLONY COUNT: CPT | Performed by: UROLOGY

## 2022-08-01 PROCEDURE — 86900 BLOOD TYPING SEROLOGIC ABO: CPT | Performed by: UROLOGY

## 2022-08-01 PROCEDURE — 87077 CULTURE AEROBIC IDENTIFY: CPT | Performed by: UROLOGY

## 2022-08-01 RX ORDER — PROPOFOL 10 MG/ML
INJECTION, EMULSION INTRAVENOUS AS NEEDED
Status: DISCONTINUED | OUTPATIENT
Start: 2022-08-01 | End: 2022-08-01

## 2022-08-01 RX ORDER — ATENOLOL 50 MG/1
50 TABLET ORAL DAILY
Status: DISCONTINUED | OUTPATIENT
Start: 2022-08-01 | End: 2022-08-02 | Stop reason: HOSPADM

## 2022-08-01 RX ORDER — FINASTERIDE 5 MG/1
5 TABLET, FILM COATED ORAL DAILY
Status: DISCONTINUED | OUTPATIENT
Start: 2022-08-01 | End: 2022-08-02 | Stop reason: HOSPADM

## 2022-08-01 RX ORDER — SODIUM CHLORIDE, SODIUM LACTATE, POTASSIUM CHLORIDE, CALCIUM CHLORIDE 600; 310; 30; 20 MG/100ML; MG/100ML; MG/100ML; MG/100ML
100 INJECTION, SOLUTION INTRAVENOUS CONTINUOUS
Status: DISCONTINUED | OUTPATIENT
Start: 2022-08-01 | End: 2022-08-02 | Stop reason: HOSPADM

## 2022-08-01 RX ORDER — PANTOPRAZOLE SODIUM 20 MG/1
20 TABLET, DELAYED RELEASE ORAL
Status: DISCONTINUED | OUTPATIENT
Start: 2022-08-01 | End: 2022-08-02 | Stop reason: HOSPADM

## 2022-08-01 RX ORDER — FENTANYL CITRATE/PF 50 MCG/ML
50 SYRINGE (ML) INJECTION
Status: DISCONTINUED | OUTPATIENT
Start: 2022-08-01 | End: 2022-08-01 | Stop reason: HOSPADM

## 2022-08-01 RX ORDER — ACETAMINOPHEN 325 MG/1
650 TABLET ORAL EVERY 6 HOURS PRN
Status: DISCONTINUED | OUTPATIENT
Start: 2022-08-01 | End: 2022-08-02 | Stop reason: HOSPADM

## 2022-08-01 RX ORDER — ONDANSETRON 2 MG/ML
INJECTION INTRAMUSCULAR; INTRAVENOUS AS NEEDED
Status: DISCONTINUED | OUTPATIENT
Start: 2022-08-01 | End: 2022-08-01

## 2022-08-01 RX ORDER — CEFAZOLIN SODIUM 2 G/50ML
2000 SOLUTION INTRAVENOUS EVERY 8 HOURS
Status: DISCONTINUED | OUTPATIENT
Start: 2022-08-01 | End: 2022-08-02 | Stop reason: HOSPADM

## 2022-08-01 RX ORDER — LIDOCAINE HYDROCHLORIDE 10 MG/ML
INJECTION, SOLUTION EPIDURAL; INFILTRATION; INTRACAUDAL; PERINEURAL AS NEEDED
Status: DISCONTINUED | OUTPATIENT
Start: 2022-08-01 | End: 2022-08-01

## 2022-08-01 RX ORDER — GLYCINE 1.5 G/100ML
SOLUTION IRRIGATION AS NEEDED
Status: DISCONTINUED | OUTPATIENT
Start: 2022-08-01 | End: 2022-08-01 | Stop reason: HOSPADM

## 2022-08-01 RX ORDER — CEFAZOLIN SODIUM 1 G/3ML
INJECTION, POWDER, FOR SOLUTION INTRAMUSCULAR; INTRAVENOUS AS NEEDED
Status: DISCONTINUED | OUTPATIENT
Start: 2022-08-01 | End: 2022-08-01

## 2022-08-01 RX ORDER — ONDANSETRON 2 MG/ML
4 INJECTION INTRAMUSCULAR; INTRAVENOUS ONCE AS NEEDED
Status: DISCONTINUED | OUTPATIENT
Start: 2022-08-01 | End: 2022-08-01 | Stop reason: HOSPADM

## 2022-08-01 RX ORDER — ATROPA BELLADONNA AND OPIUM 16.2; 3 MG/1; MG/1
30 SUPPOSITORY RECTAL EVERY 8 HOURS PRN
Status: DISCONTINUED | OUTPATIENT
Start: 2022-08-01 | End: 2022-08-02 | Stop reason: HOSPADM

## 2022-08-01 RX ADMIN — PHENYLEPHRINE HYDROCHLORIDE 50 MCG/MIN: 10 INJECTION INTRAVENOUS at 07:32

## 2022-08-01 RX ADMIN — PROPOFOL 100 MG: 10 INJECTION, EMULSION INTRAVENOUS at 07:34

## 2022-08-01 RX ADMIN — METFORMIN HYDROCHLORIDE 500 MG: 500 TABLET ORAL at 10:34

## 2022-08-01 RX ADMIN — LIDOCAINE HYDROCHLORIDE 50 MG: 10 INJECTION, SOLUTION EPIDURAL; INFILTRATION; INTRACAUDAL; PERINEURAL at 07:34

## 2022-08-01 RX ADMIN — FINASTERIDE 5 MG: 5 TABLET, FILM COATED ORAL at 10:34

## 2022-08-01 RX ADMIN — SODIUM CHLORIDE, SODIUM LACTATE, POTASSIUM CHLORIDE, AND CALCIUM CHLORIDE 100 ML/HR: .6; .31; .03; .02 INJECTION, SOLUTION INTRAVENOUS at 08:40

## 2022-08-01 RX ADMIN — PROPOFOL 50 MG: 10 INJECTION, EMULSION INTRAVENOUS at 07:36

## 2022-08-01 RX ADMIN — CEFAZOLIN 2000 MG: 1 INJECTION, POWDER, FOR SOLUTION INTRAMUSCULAR; INTRAVENOUS at 07:28

## 2022-08-01 RX ADMIN — ONDANSETRON 4 MG: 2 INJECTION INTRAMUSCULAR; INTRAVENOUS at 07:46

## 2022-08-01 RX ADMIN — CEFAZOLIN SODIUM 2000 MG: 2 SOLUTION INTRAVENOUS at 22:53

## 2022-08-01 RX ADMIN — CEFAZOLIN SODIUM 2000 MG: 2 SOLUTION INTRAVENOUS at 16:02

## 2022-08-01 RX ADMIN — METFORMIN HYDROCHLORIDE 500 MG: 500 TABLET ORAL at 16:02

## 2022-08-01 NOTE — PLAN OF CARE
Problem: MOBILITY - ADULT  Goal: Maintain or return to baseline ADL function  Description: INTERVENTIONS:  -  Assess patient's ability to carry out ADLs; assess patient's baseline for ADL function and identify physical deficits which impact ability to perform ADLs (bathing, care of mouth/teeth, toileting, grooming, dressing, etc )  - Assess/evaluate cause of self-care deficits   - Assess range of motion  - Assess patient's mobility; develop plan if impaired  - Assess patient's need for assistive devices and provide as appropriate  - Encourage maximum independence but intervene and supervise when necessary  - Involve family in performance of ADLs  - Assess for home care needs following discharge   - Consider OT consult to assist with ADL evaluation and planning for discharge  - Provide patient education as appropriate  Outcome: Progressing  Goal: Maintains/Returns to pre admission functional level  Description: INTERVENTIONS:  - Perform BMAT or MOVE assessment daily    - Set and communicate daily mobility goal to care team and patient/family/caregiver  - Collaborate with rehabilitation services on mobility goals if consulted  - Perform Range of Motion 4 times a day  - Reposition patient every 2 hours    - Dangle patient 4 times a day  - Stand patient 3 times a day  - Ambulate patient 3 times a day  - Out of bed to chair 3 times a day   - Out of bed for meals 3 times a day  - Out of bed for toileting  - Record patient progress and toleration of activity level   Outcome: Progressing     Problem: INFECTION - ADULT  Goal: Absence or prevention of progression during hospitalization  Description: INTERVENTIONS:  - Assess and monitor for signs and symptoms of infection  - Monitor lab/diagnostic results  - Monitor all insertion sites, i e  indwelling lines, tubes, and drains  - Monitor endotracheal if appropriate and nasal secretions for changes in amount and color  - [place] appropriate cooling/warming therapies per order  - Administer medications as ordered  - Instruct and encourage patient and family to use good hand hygiene technique  - Identify and instruct in appropriate isolation precautions for identified infection/condition  Outcome: Progressing  Goal: Absence of fever/infection during neutropenic period  Description: INTERVENTIONS:  - Monitor WBC    Outcome: Progressing     Problem: SAFETY ADULT  Goal: Maintain or return to baseline ADL function  Description: INTERVENTIONS:  -  Assess patient's ability to carry out ADLs; assess patient's baseline for ADL function and identify physical deficits which impact ability to perform ADLs (bathing, care of mouth/teeth, toileting, grooming, dressing, etc )  - Assess/evaluate cause of self-care deficits   - Assess range of motion  - Assess patient's mobility; develop plan if impaired  - Assess patient's need for assistive devices and provide as appropriate  - Encourage maximum independence but intervene and supervise when necessary  - Involve family in performance of ADLs  - Assess for home care needs following discharge   - Consider OT consult to assist with ADL evaluation and planning for discharge  - Provide patient education as appropriate  Outcome: Progressing  Goal: Maintains/Returns to pre admission functional level  Description: INTERVENTIONS:  - Perform BMAT or MOVE assessment daily    - Set and communicate daily mobility goal to care team and patient/family/caregiver     - Collaborate with rehabilitation services on mobility goals if consulted  - Out of bed for toileting  - Record patient progress and toleration of activity level   Outcome: Progressing  Goal: Patient will remain free of falls  Description: INTERVENTIONS:  - Educate patient/family on patient safety including physical limitations  - Instruct patient to call for assistance with activity   - Consult OT/PT to assist with strengthening/mobility   - Keep Call bell within reach  - Keep bed low and locked with side rails adjusted as appropriate  - Keep care items and personal belongings within reach  - Initiate and maintain comfort rounds  - Make Fall Risk Sign visible to staff  - Apply yellow socks and bracelet for high fall risk patients  - Consider moving patient to room near nurses station  Outcome: Progressing     Problem: DISCHARGE PLANNING  Goal: Discharge to home or other facility with appropriate resources  Description: INTERVENTIONS:  - Identify barriers to discharge w/patient and caregiver  - Arrange for needed discharge resources and transportation as appropriate  - Identify discharge learning needs (meds, wound care, etc )  - Arrange for interpretive services to assist at discharge as needed  - Refer to Case Management Department for coordinating discharge planning if the patient needs post-hospital services based on physician/advanced practitioner order or complex needs related to functional status, cognitive ability, or social support system  Outcome: Progressing     Problem: Knowledge Deficit  Goal: Patient/family/caregiver demonstrates understanding of disease process, treatment plan, medications, and discharge instructions  Description: Complete learning assessment and assess knowledge base    Interventions:  - Provide teaching at level of understanding  - Provide teaching via preferred learning methods  Outcome: Progressing

## 2022-08-01 NOTE — OP NOTE
OPERATIVE REPORT  PATIENT NAME: Ganesh Ritter    :  1927  MRN: [id number]  Pt Location: [facility] ROOM 01    SURGERY DATE: 2022    Surgeon(s) and Role:     * Enrique Cornelius MD - Primary    Preop Diagnosis:  Malignant neoplasm of bladder, unspecified ([address] ) [C67 9]    Post-Op Diagnosis Codes:     * Malignant neoplasm of bladder, unspecified ([address] ) [C67 9]    Procedure(s) (LRB):  TRANSURETHRAL RESECTION OF BLADDER TUMOR (TURBT) (N/A)  CYSTOSCOPY (N/A)    Specimen(s):  ID Type Source Tests Collected by Time Destination   1 : superficial bladder tumor Tissue Urinary Bladder TISSUE EXAM Enrique Cornelius MD 2022 [id number]    2 : deep bladder tumor Tissue Urinary Bladder TISSUE EXAM Enrique Cornelius MD 2022 0750    A :  Urine Urine, Cystoscopic URINE CULTURE Enrique Cornelius MD 2022 [id number]        Estimated Blood Loss:   Minimal    Drains:  Urethral Catheter Latex 16 Fr  (Active)   Number of days: 10       Continuous Bladder Irrigation Three-way (Active)   Number of days: 0       Anesthesia Type:   General    Operative Indications:  Malignant neoplasm of bladder, unspecified ([facility] [address] ) [C67 9]      Operative Findings:      Complications:   None    Procedure and Technique:  The patient was properly identified verbally and visually by myself and anesthesia  Time outs were appropriately done  He was prepped and draped in the dorsal lithotomy position  Bimanual revealed no organomegaly of the pelvis  Kaplan catheter was removed  A 22 Norwegian panendoscopy was used to observe the lower urinary system  Urethra was normal  Prostate was well resected  A urine C and S was sent  There was a 3 cm solid appearing tumor on the posterior aspect of the trigone extending proximally  It did not appear to extend to the orifices  There were no stones or other tumors  There is minimal catheter cystitis  32 Norwegian resectoscope was used to resect the tumor proximally to distally  This was done in a 2 stage setting deep biopsies second  Coagulation of 2 or 3 bleeders were accomplished  Estimated blood loss 15 to 20 cc's  With the bladder clear of chips a 22 Portuguese catheter was inserted with 20 cc's in the balloon  It was hooked to slow 3 way irrigation  He was sent to the recovery room in good condition      I was present for the entire procedure    Patient Disposition:  PACU       SIGNATURE: Johnie Forman MD  DATE: August 1, 2022  TIME: 8:05 AM

## 2022-08-02 ENCOUNTER — HOME HEALTH ADMISSION (OUTPATIENT)
Dept: HOME HEALTH SERVICES | Facility: HOME HEALTHCARE | Age: 87
End: 2022-08-02
Payer: MEDICARE

## 2022-08-02 VITALS
TEMPERATURE: 97.3 F | BODY MASS INDEX: 28.45 KG/M2 | OXYGEN SATURATION: 90 % | WEIGHT: 177 LBS | RESPIRATION RATE: 16 BRPM | DIASTOLIC BLOOD PRESSURE: 67 MMHG | SYSTOLIC BLOOD PRESSURE: 131 MMHG | HEART RATE: 74 BPM | HEIGHT: 66 IN

## 2022-08-02 LAB — GLUCOSE SERPL-MCNC: 273 MG/DL (ref 65–140)

## 2022-08-02 PROCEDURE — 82948 REAGENT STRIP/BLOOD GLUCOSE: CPT

## 2022-08-02 RX ADMIN — ATENOLOL 50 MG: 50 TABLET ORAL at 08:19

## 2022-08-02 RX ADMIN — PANTOPRAZOLE SODIUM 20 MG: 20 TABLET, DELAYED RELEASE ORAL at 05:09

## 2022-08-02 RX ADMIN — METFORMIN HYDROCHLORIDE 500 MG: 500 TABLET ORAL at 08:19

## 2022-08-02 RX ADMIN — CEFAZOLIN SODIUM 2000 MG: 2 SOLUTION INTRAVENOUS at 08:19

## 2022-08-02 RX ADMIN — FINASTERIDE 5 MG: 5 TABLET, FILM COATED ORAL at 08:19

## 2022-08-02 NOTE — PLAN OF CARE
Problem: INFECTION - ADULT  Goal: Absence or prevention of progression during hospitalization  Description: INTERVENTIONS:  - Assess and monitor for signs and symptoms of infection  - Monitor lab/diagnostic results  - Monitor all insertion sites, i e  indwelling lines, tubes, and drains  - Monitor endotracheal if appropriate and nasal secretions for changes in amount and color  - [place] appropriate cooling/warming therapies per order  - Administer medications as ordered  - Instruct and encourage patient and family to use good hand hygiene technique  - Identify and instruct in appropriate isolation precautions for identified infection/condition  Outcome: Progressing

## 2022-08-02 NOTE — PROGRESS NOTES
Post op day one  Urine clear  Vital signs stable No complaints  Discharge with cheatham catheter  Patient advised   We will see next Thursday

## 2022-08-02 NOTE — CASE MANAGEMENT
Case Management Discharge Planning Note    Patient name Jv Powers  Location /-19 MRN [id number]  : 1927 Date 2022       Current Admission Date: 2022  Current Admission Diagnosis:Malignant neoplasm of bladder, unspecified [facility])   Patient Active Problem List    Diagnosis Date Noted    Diabetes mellitus, type 2 ([address] ) 2022    Essential hypertension 2019    Ventricular premature depolarization 2019      LOS (days): 0  Geometric Mean LOS (GMLOS) (days):   Days to GMLOS:     OBJECTIVE:            Current admission status: Outpatient Surgery   Preferred Pharmacy:   [facility] [address]  [address] [place] [id number]  Phone: [phone] Fax: [phone]    Primary Care Provider: Mai Mane MD    Primary Insurance: MEDICARE  Secondary Insurance:  FOR LIFE    DISCHARGE DETAILS:  Family requested VNA prior to leaving  CM unable to meet with them in person  CM called daughter Faustino Layman [phone]  She requested [facility]  CM sent referral in [place]   TT Dr Brenda Lantigua for orders  NO response after several hours  CM called the office then called the on call services requesting Dr Tree Chu be paged to add the order for "Ambulatory referral for VNA"

## 2022-08-03 LAB
BACTERIA UR CULT: ABNORMAL
BACTERIA UR CULT: ABNORMAL

## 2022-08-03 NOTE — CASE MANAGEMENT
Case Management Discharge Planning Note    Patient name Carol Blank  Location /-05 MRN [id number]  : 1927 Date 8/3/2022       Current Admission Date: 2022  Current Admission Diagnosis:Malignant neoplasm of bladder, unspecified [facility])   Patient Active Problem List    Diagnosis Date Noted    Diabetes mellitus, type 2 ([address] ) 2022    Essential hypertension 2019    Ventricular premature depolarization 2019      LOS (days): 0  Geometric Mean LOS (GMLOS) (days):   Days to GMLOS:     OBJECTIVE:            Current admission status: Outpatient Surgery   Preferred Pharmacy:   [facility] [address]  [address] [place] [id number]  Phone: [phone] Fax: [phone]    Primary Care Provider: Amira Saavedra MD    Primary Insurance: MEDICARE  Secondary Insurance:  FOR LIFE    DISCHARGE DETAILS:         Additional Comments: Mark  patients daughter called stating [facility] has not contacted the family  CM called [facility]'s  A they never received a referral "ambulatory referral for VNA" CM called Dr Yanet Talbot office spoke to  the answering services and requeted to speak to the office  CM left VM on dr Blanquita Aceves cell phone

## 2022-08-05 ENCOUNTER — HOME CARE VISIT (OUTPATIENT)
Dept: HOME HEALTH SERVICES | Facility: HOME HEALTHCARE | Age: 87
End: 2022-08-05

## 2022-08-06 ENCOUNTER — HOME CARE VISIT (OUTPATIENT)
Dept: HOME HEALTH SERVICES | Facility: HOME HEALTHCARE | Age: 87
End: 2022-08-06
Payer: MEDICARE

## 2022-08-06 VITALS
RESPIRATION RATE: 18 BRPM | TEMPERATURE: 97.7 F | SYSTOLIC BLOOD PRESSURE: 128 MMHG | HEART RATE: 78 BPM | OXYGEN SATURATION: 94 % | DIASTOLIC BLOOD PRESSURE: 60 MMHG

## 2022-08-06 PROCEDURE — G0299 HHS/HOSPICE OF RN EA 15 MIN: HCPCS

## 2022-08-06 PROCEDURE — 400013 VN SOC

## 2022-08-06 PROCEDURE — 10330081 VN NO-PAY CLAIM PROCEDURE

## 2022-08-06 NOTE — CASE COMMUNICATION
Ship to x Pt  Home or Clinician    Insurance: [facility]     All items are ordered by the each unless otherwise noted    PLEASE DO NOT ORDER BY THE BOX  Request specific quantity needed  For Private Insurances order should be for a 2 week period    [employer]  20fr 30cc cath [id number]  Syringe 60cc [id number]  30cc cath tray [id number]  Drain bag 2000cc [id number]_7  Leg bag med [id number]  Cath secure leg strap 170959_4  Saline 100ml [id number]

## 2022-08-06 NOTE — CASE COMMUNICATION
[facility] has admitted your patient to [address] [place] [address] service with the following disciplines:      SN  This report is informational only, no responses is needed  Primary focus of home health care  related to care of indwelling cheatham catheter  Patient stated goals of care: removal of catheter  Anticipated visit pattern and next visit date*: 2w3, 1w6  next visit: 8/9/22    See medication list - meds in home differ from AVS: NA home me ds reflect AVS    Potential barriers to goal achievement: complexity of care, dexterity, lack of functional mobility    Thank you for allowing us to participate in the care of your patient        Kady Pepper RN with [facility]

## 2022-08-09 ENCOUNTER — HOME CARE VISIT (OUTPATIENT)
Dept: HOME HEALTH SERVICES | Facility: HOME HEALTHCARE | Age: 87
End: 2022-08-09
Payer: MEDICARE

## 2022-08-09 VITALS
SYSTOLIC BLOOD PRESSURE: 128 MMHG | HEART RATE: 88 BPM | OXYGEN SATURATION: 97 % | RESPIRATION RATE: 16 BRPM | DIASTOLIC BLOOD PRESSURE: 70 MMHG | TEMPERATURE: 97.8 F

## 2022-08-09 PROCEDURE — G0299 HHS/HOSPICE OF RN EA 15 MIN: HCPCS

## 2022-08-12 ENCOUNTER — HOME CARE VISIT (OUTPATIENT)
Dept: HOME HEALTH SERVICES | Facility: HOME HEALTHCARE | Age: 87
End: 2022-08-12
Payer: MEDICARE

## 2022-08-12 VITALS
RESPIRATION RATE: 16 BRPM | TEMPERATURE: 97.5 F | SYSTOLIC BLOOD PRESSURE: 116 MMHG | OXYGEN SATURATION: 92 % | DIASTOLIC BLOOD PRESSURE: 70 MMHG | HEART RATE: 68 BPM

## 2022-08-12 PROCEDURE — G0299 HHS/HOSPICE OF RN EA 15 MIN: HCPCS

## 2022-08-16 ENCOUNTER — DOCUMENTATION (OUTPATIENT)
Dept: HEMATOLOGY ONCOLOGY | Facility: CLINIC | Age: 87
End: 2022-08-16

## 2022-08-16 NOTE — PROGRESS NOTES
Oncology Navigator Note: Multidisciplinary Urology Case Review 8/16/2022    Diagnosis:  Lauryn Becker is a 80 yr old man with a history of Prostate Cancer with rising PSA to 23 2 in 5/2022 while receiving Lupron  Found to have bladder mass on Ultrasound  S/P TURBT showing prostatic adenocarcinoma          Recommendations of Group:  Dependent upon pt's goals of care and if pt is asymptomatic may continue ADT alone, however, it pt desires to pursue additional treatment [facility] PET and Medical Oncology referral to discuss Oral ADT  Upcoming Appointments:    Future Appointments   Date Time Provider So Ar[place]   8/19/2022 To Be Determined Oren Gates, RN [facility]   8/26/2022 To Be Determined Oren Gates, RN [facility]   8/31/2022 To Be Determined Oren Gates, RN [facility]   9/7/2022 To Be Determined Lott Kody, RN [facility]   9/14/2022 To Be Determined Oren Gates, RN [facility]   9/21/2022 To Be Determined Oren Gates, RN [facility]   9/28/2022 To Be Determined Lott Kody, RN [facility]   10/4/2022 To Be Determined Oren Gates, RN [facility]            Patient was discussed at the Multidisciplinary Urology Case review on 8/16/2022      NCCN guidelines were available for review  The final treatment plan will be left to the discretion of the patient and the treating physician  DISCLAIMERS:  TO THE TREATING PHYSICIAN:  This conference is a meeting of clinicians from various specialty areas who evaluate and discuss patients for whom a multidisciplinary treatment approach is being considered  Please note that the above opinion was a consensus of the conference attendees and is intended only to assist in quality care of the discussed patient    The responsibility for follow up on the input given during the conference, along with any final decisions regarding plan of care, is that of the xalatan (latanoprost) one drop once a day in both eyes.    dorzolomide one drop 2 times a day in both eyes.    Follow up in 6 months.    patient and the patient's provider  Accordingly, appointments have only been recommended based on this information and have NOT been scheduled unless otherwise noted  TO THE PATIENT:  This summary is a brief record of major aspects of your cancer treatment  You may choose to share a copy with any of your doctors or nurses  However, this is not a detailed or comprehensive record of your care

## 2022-08-17 ENCOUNTER — HOME CARE VISIT (OUTPATIENT)
Dept: HOME HEALTH SERVICES | Facility: HOME HEALTHCARE | Age: 87
End: 2022-08-17
Payer: MEDICARE

## 2022-08-17 PROCEDURE — 10330064 CATH SECURE, STATLOCK FOLEY SWVL SIL TRI

## 2022-08-19 ENCOUNTER — HOME CARE VISIT (OUTPATIENT)
Dept: HOME HEALTH SERVICES | Facility: HOME HEALTHCARE | Age: 87
End: 2022-08-19
Payer: MEDICARE

## 2022-08-19 ENCOUNTER — TELEPHONE (OUTPATIENT)
Dept: HEMATOLOGY ONCOLOGY | Facility: CLINIC | Age: 87
End: 2022-08-19

## 2022-08-19 VITALS
RESPIRATION RATE: 16 BRPM | OXYGEN SATURATION: 97 % | DIASTOLIC BLOOD PRESSURE: 68 MMHG | TEMPERATURE: 97.2 F | HEART RATE: 78 BPM | SYSTOLIC BLOOD PRESSURE: 126 MMHG

## 2022-08-19 PROCEDURE — G0299 HHS/HOSPICE OF RN EA 15 MIN: HCPCS

## 2022-08-19 NOTE — TELEPHONE ENCOUNTER
Attempt to schedule consult - spoke with patients spouse who took down hopeline number and states she will have her grand daughter call back to schedule

## 2022-08-22 ENCOUNTER — APPOINTMENT (OUTPATIENT)
Dept: LAB | Age: 87
End: 2022-08-22
Payer: MEDICARE

## 2022-08-22 ENCOUNTER — PATIENT OUTREACH (OUTPATIENT)
Dept: HEMATOLOGY ONCOLOGY | Facility: CLINIC | Age: 87
End: 2022-08-22

## 2022-08-22 ENCOUNTER — TELEPHONE (OUTPATIENT)
Dept: SURGICAL ONCOLOGY | Facility: CLINIC | Age: 87
End: 2022-08-22

## 2022-08-22 DIAGNOSIS — C61 PROSTATE CANCER (HCC): Primary | ICD-10-CM

## 2022-08-22 DIAGNOSIS — R61 GENERALIZED HYPERHIDROSIS: ICD-10-CM

## 2022-08-22 LAB
BUN SERPL-MCNC: 24 MG/DL (ref 5–25)
CREAT SERPL-MCNC: 1.52 MG/DL (ref 0.6–1.3)
ERYTHROCYTE [DISTWIDTH] IN BLOOD BY AUTOMATED COUNT: 17.3 % (ref 11.6–15.1)
GFR SERPL CREATININE-BSD FRML MDRD: 38 ML/MIN/1.73SQ M
HCT VFR BLD AUTO: 37.8 % (ref 36.5–49.3)
HGB BLD-MCNC: 11.5 G/DL (ref 12–17)
MCH RBC QN AUTO: 25.2 PG (ref 26.8–34.3)
MCHC RBC AUTO-ENTMCNC: 30.4 G/DL (ref 31.4–37.4)
MCV RBC AUTO: 83 FL (ref 82–98)
PLATELET # BLD AUTO: 190 THOUSANDS/UL (ref 149–390)
PMV BLD AUTO: 10.6 FL (ref 8.9–12.7)
RBC # BLD AUTO: 4.57 MILLION/UL (ref 3.88–5.62)
WBC # BLD AUTO: 10.95 THOUSAND/UL (ref 4.31–10.16)

## 2022-08-22 PROCEDURE — 36415 COLL VENOUS BLD VENIPUNCTURE: CPT

## 2022-08-22 PROCEDURE — 85027 COMPLETE CBC AUTOMATED: CPT

## 2022-08-22 PROCEDURE — 82565 ASSAY OF CREATININE: CPT

## 2022-08-22 PROCEDURE — 84520 ASSAY OF UREA NITROGEN: CPT

## 2022-08-22 PROCEDURE — G0103 PSA SCREENING: HCPCS

## 2022-08-22 NOTE — PROGRESS NOTES
Intake received, chart reviewed for need of external records  Pathology completed:8/1  Imaging completed:6/22, 6/6  All records needed are in patients chart  No further action required of Care Coordination team needed at this time

## 2022-08-22 NOTE — PROGRESS NOTES
Outreach to pt and left voicemail introducing myself and role as Nurse Navigator during his process, providing support and helping to coordinate appts  Informed that social work may also reach out to assess for any assistance needed    Provided contact information and requested call back

## 2022-08-22 NOTE — TELEPHONE ENCOUNTER
08/22/22  Intake received   follow with patient outreach    08/23/22  Placed call to patient  Spoke with wife [facility] and reviewed the role of the Oncology Financial Counselor and the services we can assist with  The patient will be provided with my direct number [phone] and e mail address [email or web address]  org to day by e mail to  [email or web address]  She thanked me for the call

## 2022-08-23 ENCOUNTER — PATIENT OUTREACH (OUTPATIENT)
Dept: CASE MANAGEMENT | Facility: OTHER | Age: 87
End: 2022-08-23

## 2022-08-23 LAB — PSA SERPL-MCNC: 51.6 NG/ML (ref 0–4)

## 2022-08-30 ENCOUNTER — HOME CARE VISIT (OUTPATIENT)
Dept: HOME HEALTH SERVICES | Facility: HOME HEALTHCARE | Age: 87
End: 2022-08-30
Payer: MEDICARE

## 2022-09-01 ENCOUNTER — HOSPITAL ENCOUNTER (OUTPATIENT)
Dept: RADIOLOGY | Age: 87
Discharge: HOME | End: 2022-09-01
Payer: MEDICARE

## 2022-09-01 DIAGNOSIS — C61 PROSTATE CANCER (HCC): ICD-10-CM

## 2022-09-01 PROCEDURE — A9595 HB PIFLUFOLASTAT F-18, DIAGNOSTIC, 1 MILLICURIE: HCPCS

## 2022-09-01 PROCEDURE — 78815 PET IMAGE W/CT SKULL-THIGH: CPT

## 2022-09-07 ENCOUNTER — HOME CARE VISIT (OUTPATIENT)
Dept: HOME HEALTH SERVICES | Facility: HOME HEALTHCARE | Age: 87
End: 2022-09-07
Payer: MEDICARE

## 2022-09-07 VITALS
RESPIRATION RATE: 16 BRPM | HEART RATE: 88 BPM | DIASTOLIC BLOOD PRESSURE: 70 MMHG | TEMPERATURE: 98.2 F | SYSTOLIC BLOOD PRESSURE: 128 MMHG | OXYGEN SATURATION: 98 %

## 2022-09-07 PROCEDURE — 400013 VN SOC

## 2022-09-07 PROCEDURE — G0299 HHS/HOSPICE OF RN EA 15 MIN: HCPCS

## 2022-09-08 PROCEDURE — 10330064 SALINE, IRR SOL STR 100ML (48/CS) MGM37

## 2022-09-10 ENCOUNTER — HOME CARE VISIT (OUTPATIENT)
Dept: HOME HEALTH SERVICES | Facility: HOME HEALTHCARE | Age: 87
End: 2022-09-10
Payer: MEDICARE

## 2022-09-10 PROCEDURE — G0299 HHS/HOSPICE OF RN EA 15 MIN: HCPCS

## 2022-09-12 VITALS
RESPIRATION RATE: 18 BRPM | SYSTOLIC BLOOD PRESSURE: 132 MMHG | TEMPERATURE: 98.3 F | DIASTOLIC BLOOD PRESSURE: 64 MMHG | OXYGEN SATURATION: 99 % | HEART RATE: 76 BPM

## 2022-09-15 ENCOUNTER — CONSULT (OUTPATIENT)
Dept: HEMATOLOGY ONCOLOGY | Facility: CLINIC | Age: 87
End: 2022-09-15
Payer: MEDICARE

## 2022-09-15 VITALS
DIASTOLIC BLOOD PRESSURE: 78 MMHG | HEART RATE: 64 BPM | RESPIRATION RATE: 17 BRPM | TEMPERATURE: 97.4 F | HEIGHT: 66 IN | BODY MASS INDEX: 28.57 KG/M2 | SYSTOLIC BLOOD PRESSURE: 140 MMHG | OXYGEN SATURATION: 94 %

## 2022-09-15 DIAGNOSIS — C61 PROSTATE CANCER (HCC): ICD-10-CM

## 2022-09-15 PROCEDURE — 99205 OFFICE O/P NEW HI 60 MIN: CPT | Performed by: INTERNAL MEDICINE

## 2022-09-15 RX ORDER — ABIRATERONE ACETATE 250 MG/1
250 TABLET ORAL DAILY
Qty: 30 TABLET | Refills: 3 | Status: SHIPPED | OUTPATIENT
Start: 2022-09-15

## 2022-09-15 NOTE — PROGRESS NOTES
[place]  7/27/1927  [facility] [address] [address] [phone]    Chief Complaint   Patient presents with    Consult           Oncology History    No history exists  History of Present Illness:  Patient had PSA in the 2 7-4 0 range from 20   It was not monitored until April 2021 109 0  He was started on Lupron injections for presumed prostate cancer at that time  Biopsy was deferred due to potential procedural risk  PSA declined to 1 7  By May 2022 PSA increased to 2 3  He developed progressive urinary obstructive symptoms  His family believes Casodex was started at about this time  Patient has a history of hypertonic neurogenic bladder with indwelling Kaplan  August 1, 2022 patient underwent TURBT  Biopsy showed adenocarcinoma in superficial bladder and deep bladder tumor  September 1, 2022 PSMA PET showed left para-aortic node 2 2 cm, SUV 1 0, retrocaval node, 2 4 cm, SUV 1 2  External iliac node, right, 2 3 cm, SUV 1 7, left external iliac node 1 7 cm, SUV 1 8  L3 sclerotic lesion, SUV 4, left manubrium, SUV 4 7  PSA 51 6 CMP shows creatinine 1 6, glucose 338, alk-phos 137, otherwise normal   WBC 9 7, hemoglobin 11 3, platelet count 547, MCV 82, normal differential     Review of Systems   Constitutional: Negative for chills and fever  HENT: Negative for nosebleeds  Eyes: Negative for discharge  Respiratory: Negative for cough and shortness of breath  Cardiovascular: Negative for chest pain  Gastrointestinal: Negative for abdominal pain, constipation and diarrhea  Endocrine: Negative for polydipsia  Genitourinary: Negative for hematuria  Musculoskeletal: Negative for arthralgias  Skin: Negative for color change  Allergic/Immunologic: Negative for immunocompromised state  Neurological: Negative for dizziness and headaches  Hematological: Negative for adenopathy  Psychiatric/Behavioral: Negative for agitation  Patient Active Problem List   Diagnosis    Essential hypertension    Ventricular premature depolarization    Diabetes mellitus, type 2 ([facility] )     Past Medical History:   Diagnosis Date    Arrhythmia     Bilateral impacted cerumen 02/26/2019    CVA (cerebral vascular accident) ([facility] )     Diabetes ([facility] )     GERD (gastroesophageal reflux disease)     History of transfusion     Hypertension     Kidney stone     Murmur     Prostate disease     Stomach ulcer     Unsteadiness     Vertigo      Past Surgical History:   Procedure Laterality Date    CATARACT EXTRACTION, BILATERAL      COLONOSCOPY      CYSTOSCOPY N/A 8/1/2022    Procedure: CYSTOSCOPY;  Surgeon: Alexandra Steve MD;  [facility];  Service: Urology    SKIN BIOPSY      SKIN CANCER EXCISION      TRANSURETHRAL RESECTION OF BLADDER TUMOR N/A 8/1/2022    Procedure: TRANSURETHRAL RESECTION OF BLADDER TUMOR (TURBT);   Surgeon: Alexandra Steve MD;  [facility];  Service: Urology     Family History   Problem Relation Age of Onset    Heart failure Mother     Cancer Father      Social History     Socioeconomic History    Marital status: /Civil Union     Spouse name: Not on file    Number of children: Not on file    Years of education: Not on file    Highest education level: Not on file   Occupational History    Not on file   Tobacco Use    Smoking status: Never Smoker    Smokeless tobacco: Never Used   Vaping Use    Vaping Use: Never used   Substance and Sexual Activity    Alcohol use: No    Drug use: Never    Sexual activity: Not Currently   Other Topics Concern    Not on file   Social History Narrative    Not on file     Social Determinants of Health     Financial Resource Strain: Not on file   Food Insecurity: Not on file   Transportation Needs: Not on file   Physical Activity: Not on file   Stress: Not on file   Social Connections: Not on file   Intimate Partner Violence: Not on file   Housing Stability: Not on file       Current Outpatient Medications:     atenolol (TENORMIN) 50 mg tablet, Take 50 mg by mouth daily, Disp: , Rfl:     ESOMEPRAZOLE MAGNESIUM PO, Take 2 capsules by mouth daily after breakfast, Disp: , Rfl:     finasteride (PROSCAR) 5 mg tablet, Take 5 mg by mouth daily, Disp: , Rfl:     metFORMIN (GLUCOPHAGE) 500 mg tablet, Take 500 mg by mouth 2 (two) times a day with meals , Disp: , Rfl:     Multiple Vitamins-Minerals (CENTRUM SILVER ULTRA MENS PO), Take by mouth, Disp: , Rfl:   Allergies   Allergen Reactions    Aspirin GI Intolerance    Nsaids GI Intolerance    Penicillins Hives     Vitals:    09/15/22 1541   BP: 140/78   Pulse: 64   Resp: 17   Temp: (!) 97 4 °F (36 3 °C)   SpO2: 94%       Physical Exam  Constitutional:       Appearance: He is well-developed  HENT:      Head: Normocephalic and atraumatic  Eyes:      Pupils: Pupils are equal, round, and reactive to light  Cardiovascular:      Rate and Rhythm: Normal rate and regular rhythm  Heart sounds: No murmur heard  Pulmonary:      Breath sounds: Normal breath sounds  No wheezing or rales  Abdominal:      Palpations: Abdomen is soft  Tenderness: There is no abdominal tenderness  Musculoskeletal:         General: No tenderness  Normal range of motion  Cervical back: Neck supple  Lymphadenopathy:      Cervical: No cervical adenopathy  Skin:     Findings: No erythema or rash  Neurological:      Mental Status: He is alert and oriented to person, place, and time  Cranial Nerves: No cranial nerve deficit  Deep Tendon Reflexes: Reflexes are normal and symmetric     Psychiatric:         Behavior: Behavior normal            Labs:  CBC, Coags, BMP, Mg, Phos      Imaging  NM PET CT skull base to mid thigh    Result Date: 9/2/2022  Narrative: PYLARIFY PSMA PET/CT SCAN INDICATION:  C61: Malignant neoplasm of prostate   , restaging MODIFIER: PS COMPARISON: None CELL TYPE:  Prostatic adenocarcinoma, bladder biopsy 8/1/2022 TECHNIQUE:   9 8 mCi F-18 Pylarify PSMA administered IV  Multiplanar attenuation corrected and non attenuation corrected PET images were acquired 60 minutes post injection  Contiguous, low dose, axial CT sections were obtained from the vertex through the  femurs   Intravenous or oral contrast was not utilized  This examination, like all CT scans performed in the [facility], was performed utilizing techniques to minimize radiation dose exposure, including the use of iterative reconstruction and automated exposure control  FINDINGS: VISUALIZED BRAIN:   No acute abnormalities are seen  HEAD/NECK:   There is a physiologic distribution of the radiotracer  No PSMA avid cervical adenopathy is seen  CT images: Unremarkable  CHEST:   No PSMA avid soft tissue lesions are seen  CT images: Coronary atherosclerosis  ABDOMEN: There is retroperitoneal adenopathy, demonstrating only minimal PSMA activity, but nonetheless most concerning for metastases  For example, left para-aortic node image 3/207 measures 2 2 x 1 4 cm, SUV 1  Retrocaval node image 3/210 measures 2 4 x 1 3 cm, SUV 1 2  CT images: Colon diverticula  Bilateral renal cysts  Moderate right hydroureteronephrosis to the level of the UVJ, likely related to known bladder tumor  PELVIS: Physiologic intraluminal bladder activity is present, limiting evaluation  Bladder is also collapsed with Kaplan catheter  Bladder walls still appear thickened, with adjacent infiltration  However no discrete focal PSMA avid lesions are visualized in the bladder walls  There is adenopathy along the bilateral iliac vessels, demonstrating only minimal asymmetric activity, but nonetheless suspicious for metastases  For example, right external iliac node image 3/242 measures 2 3 x 1 8 cm, SUV 1 7  Left pelvic node posterior to the left external iliac vessels image 3/246 measures 1 7 x 1 4 cm, SUV 1 8    Some of this activity is likely due to the contiguous distal left ureter  No focal PSMA avid lesions are visualized in the prostatectomy bed  CT images: Collapsed bladder with Kaplan catheter  OSSEOUS STRUCTURES: L3 sclerotic lesion with increased PSMA activity, SUV 4, is most concerning for a metastasis  Sclerotic lesion in the left manubrium has an SUV of 4 7  Subtle PSMA activity in the left iliac bone, SUV 1 6, is also suspicious for a metastasis  CT images: Spine degenerative change  Impression: 1  There is retroperitoneal and bilateral pelvic adenopathy  Although this demonstrates only minimal PSMA activity, this is most concerning for metastases  Further characterization with FDG PET/CT may be considered  2   PSMA avid sclerotic lesions in the L3 vertebral body, left the manubrium, and left iliac bone, most concerning for osseous metastases  3   Moderate left hydroureteronephrosis  This may be related to known bladder tumor  Workstation performed: [id number]     I reviewed the above laboratory and imaging data  Discussion/Summary:  In summary, this is a 26-year-old male history of prostate cancer as outlined  He has some lymph node as well as oligo- bone metastases  We discussed that medical therapy would include Lupron +/-aromatase inhibitor versus androgen receptor blockade  Chandler Chata has recently come off patent  Douglas would probably be about 50 dollars per month  Chandler Chata is preferred due to alternative mechanism of action than Casodex  We reviewed potential toxicities including but not limited to adrenal insufficiency, fatigue, hot flashes, diarrhea, less frequent episodes of abnormal blood counts or liver enzyme abnormalities  I recommended Zytiga 250 mg p o  daily with a low-fat meal   CBC, CMP q 4 weeks  PSA/testosterone just prior to his next visit in 2 months    We reviewed that the goal of therapy is disease control, palliative benefit, survival benefit but that cure is not likely  The patient and his family voiced understanding and agreement with the above plan will be proceeding as outlined

## 2022-09-19 ENCOUNTER — PATIENT OUTREACH (OUTPATIENT)
Dept: CASE MANAGEMENT | Facility: OTHER | Age: 87
End: 2022-09-19

## 2022-09-19 NOTE — PROGRESS NOTES
OSW placed outreach call to pt's daughter, Jaime Connolly today  LM on VM with detailed message along with return call information  Will follow

## 2022-09-21 ENCOUNTER — HOME CARE VISIT (OUTPATIENT)
Dept: HOME HEALTH SERVICES | Facility: HOME HEALTHCARE | Age: 87
End: 2022-09-21
Payer: MEDICARE

## 2022-09-21 PROCEDURE — G0299 HHS/HOSPICE OF RN EA 15 MIN: HCPCS

## 2022-09-21 NOTE — CASE COMMUNICATION
Patient moved to [facility] house two days ago to [place] [id number], please update new CM to proper territory and such   Thank you

## 2022-09-22 ENCOUNTER — HOME CARE VISIT (OUTPATIENT)
Dept: HOME HEALTH SERVICES | Facility: HOME HEALTHCARE | Age: 87
End: 2022-09-22
Payer: MEDICARE

## 2022-09-22 VITALS — DIASTOLIC BLOOD PRESSURE: 78 MMHG | TEMPERATURE: 99.9 F | HEART RATE: 88 BPM | SYSTOLIC BLOOD PRESSURE: 129 MMHG

## 2022-09-26 ENCOUNTER — PATIENT OUTREACH (OUTPATIENT)
Dept: CASE MANAGEMENT | Facility: OTHER | Age: 87
End: 2022-09-26

## 2022-09-26 VITALS
RESPIRATION RATE: 16 BRPM | TEMPERATURE: 98.8 F | SYSTOLIC BLOOD PRESSURE: 135 MMHG | DIASTOLIC BLOOD PRESSURE: 76 MMHG | HEART RATE: 88 BPM

## 2022-09-26 NOTE — PROGRESS NOTES
OSW placed 2nd outreach call to Ms Ricci today  Provided detailed message along with return call information  Will follow  Biopsychosocial and Barriers Assessment    Cancer Diagnosis: Stage IV Prostate Cancer  Home/Cell Phone: [phone]  Emergency Contact: Toni Carpenter (daughter)  Marital Status:   Interpretation concerns, speaks another language, preferred language: English  Cultural concerns: none  Ability to read or write: yes    Caregiver/Support: Family and private caregivers  Children: 1 daughter  Child/Elder care: wife needs supervision and care    Housing: Home  Home Setup: multistory  Lives With: daughter and wife  Daily Living Activities: needs assistance  Durable Medical Equipment: walker  Ambulation: uses walker for mobility    Preferred Pharmacy: [facility]  High co-pays with insurance: Medicare primary,  for life secondary  High co-pays with medication coverage: none at this time  No medication coverage: N/A    Primary Care Provider: Bryon Tran MD [facility]  Hx of Home Health Care: open with   [facility]  Hx of Short term rehab: none  Mental Health Hx: none  Substance Abuse Hx: none  Employment: retired for age  [employer] Status/Location: Trail-service connected with [address] [place] [address] to pay bills: yes  POA/LW/AD: not addressed  Transportation Plan/Concerns: daughter and family drive pt      What do you know about your Cancer Diagnosis    What has your doctor told you about your cancer diagnosis: N/A-spoke with daughter    What has your doctor told you about your cancer treatment:N/A-spoke with daughter    What specific concerns do you have about your diagnosis and treatment:N/A-spoke with daughter    Have you been made aware of any hair loss associated with treatment:N/A-spoke with daughter    Additional Comments:    Addendum:    Pt[facility]   Introduced self and explained role of oncology SW team  Lucia Drake stated she recently moved both her parents into her home  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 because of the amount of care they both need  Rehan Peter is retired, but was working PT for extra income and for something to do  She was not expecting to be providing as much supervision and care to her parents  She has hired caregivers coming in 3 days/week from [employer]  Her father is open with [facility] for cheatham catheter care  She explained her mother had a THR on 9/12  Unfortunately she "brought home COVID" after her surgery and she and her father are now sick with it  Both parents have experienced incontinence of bowel and coughing  Her father started Paxlovid, which she says is helping tremendously  Rehan Peter shared her mother is extremely depressed, and she is very worried she won't recover from her THR and COVID infection  She stated her mother's negative outlook and depression has been very emotionally draining to her  She stated her father does not seem to understand his prognosis of Stage IV prostate cancer, but stated she is not going to discuss prognosis with him because "he is 80years old and is very content and happy right now " He does not endorse any symptoms from his disease or the treatments he is getting for his cancer  Offered emotional support to Rehan Peter and offered to send her resources for caregiver stress and support  She is receptive and appreciative assistance  Offered to call her next month and she is thankful   Confirmed her email is: [email or web address]

## 2022-10-03 ENCOUNTER — HOME CARE VISIT (OUTPATIENT)
Dept: HOME HEALTH SERVICES | Facility: HOME HEALTHCARE | Age: 87
End: 2022-10-03

## 2022-10-03 PROCEDURE — G0299 HHS/HOSPICE OF RN EA 15 MIN: HCPCS

## 2022-10-04 ENCOUNTER — HOME CARE VISIT (OUTPATIENT)
Dept: HOME HEALTH SERVICES | Facility: HOME HEALTHCARE | Age: 87
End: 2022-10-04
Payer: MEDICARE

## 2022-10-04 VITALS
TEMPERATURE: 97.3 F | HEART RATE: 78 BPM | SYSTOLIC BLOOD PRESSURE: 128 MMHG | OXYGEN SATURATION: 98 % | DIASTOLIC BLOOD PRESSURE: 66 MMHG | RESPIRATION RATE: 28 BRPM

## 2022-10-05 PROCEDURE — 400014 VN F/U

## 2022-10-05 NOTE — CASE COMMUNICATION
60 day summary of care for DOS   8/6/22 to 10/4/22   Susie Castano starts on 10/5/22    Patient has been seen in the past 60 days for assessment and teaching regarding disease process of   Vital signs from 10/3/22  Changes in patient condition in the last 60 days (i e labs, med changes, falls, assessment findings) include   Additional disciplines include  None  Changes to medication orders in the past 60 days include *  The patient re zacarias homebound due to decreased ambulation, limited endurance, needs assistance to leave home  The availability of a willing and able caregiver   MD  appointments were     Patient will require continued care for assessment and instruction in disease process of   All home health orders including treatments and visit order frequencies were reviewed and deemed appropriate at the time of this summary

## 2022-10-05 NOTE — CASE COMMUNICATION
60 day summary of care for DOS  from 8/6/22 to 10/4/22   New certificaation period begins 10/5/22    Patient has been seen in the past 60 days for assessment and teaching regarding bladder cancer and cheatham care due to neurogenic bladder  Martina Smith Last cheatham change was 9/7/22 and next change was due around 10/15/22 but cheatham was changed on 69/6/22 along with recertification visit  Martina Smith Next change due around 11/14/22  Vital signs from 10/3/22 visit   /66  T 97 3 AP 78 and irregular R 28 Pulse ox 98%  Changes in patient condition in the last 60 days (i e labs, med changes, falls, assessment findings) include Patient had COVID but did well and is asymptomatic at this time  Additional disciplines None  Changes to medication orders in the past 60 days include  New to Zytiga and Dexamethasone  Also in reviewing meds patient takes Omeprazole 40mg daily in place of Nexium  The patient  remains homebound due to difficulty with ambulation, limited endurance, requires assistance to leave home, uses walker and wheelchair when needs to leave home  The availability of a willing and able caregiver  Yes  Patient now living with his daughter  MD  appointments were Dr Zaira Barillas 9/15/22    Patient will require continued care for assessment and instruction in disease process of bladder cancer and neurogenic bladder and continued f oley catheter care and changes every 6 weeks    All home health orders including treatments and visit order frequencies were reviewed and deemed appropriate at the time of this summary

## 2022-10-10 ENCOUNTER — PATIENT OUTREACH (OUTPATIENT)
Dept: CASE MANAGEMENT | Facility: OTHER | Age: 87
End: 2022-10-10

## 2022-10-10 NOTE — PROGRESS NOTES
OSW placed supportive outreach call to pt's daughter, Virginia Jacques today  LM on VM with return call information  Will follow

## 2022-10-24 ENCOUNTER — PATIENT OUTREACH (OUTPATIENT)
Dept: CASE MANAGEMENT | Facility: OTHER | Age: 87
End: 2022-10-24

## 2022-10-24 NOTE — PROGRESS NOTES
OSW called pt's daughter, Sanford Paul today  She shared her father is having more difficulty walking at home and requires a wheelchair for most of his mobility  Her mother fell and dislocated her THR and is currently at [facility]  Her father insists on seeing his wife daily, and Sanford Paul shared navigating the wheelchair and her father is very difficult  She is willing to bring her mother home when she is better, but worries about the home setup because they have to use a stairglide to their main living quarters  She stated she was not prepared for the amount of caregiving that is actually needed for her parents  Inquired if she considered PCF/FEI care, and explained Katy Pepper has this available  Explained her parents can live together in a small apartment with meals, meds, laundry, and personal care provided by trained staff  She stated she had not thought of this, but would inquire with Katy Pepper today  Explained this level of care is an out of pocket cost     Sanford Paul then asked about hospice for her father  Explained hospice philosophy and provided overview of the program  Explained treatment for his metastatic prostate cancer would stop, and focus would be transitioned to comfort  Explained hospice team would come to her father at home, or wherever he may be living at that point  Sanford Paul explained her brother came in to visit and he noted a rapid decline in pt's functioning  She is concerned his health is failing faster than she realizes  She appreciated information and stated she will speak to her family  Scheduled follow up after next hematology oncology visit next month  Sanford Julitoks expressed appreciation for active listening and providing support

## 2022-11-02 ENCOUNTER — APPOINTMENT (OUTPATIENT)
Dept: LAB | Age: 87
End: 2022-11-02

## 2022-11-02 DIAGNOSIS — C61 PROSTATE CANCER (HCC): ICD-10-CM

## 2022-11-02 LAB
ALBUMIN SERPL BCP-MCNC: 3.1 G/DL (ref 3.5–5)
ALP SERPL-CCNC: 90 U/L (ref 46–116)
ALT SERPL W P-5'-P-CCNC: 34 U/L (ref 12–78)
ANION GAP SERPL CALCULATED.3IONS-SCNC: 7 MMOL/L (ref 4–13)
AST SERPL W P-5'-P-CCNC: 14 U/L (ref 5–45)
BASOPHILS # BLD AUTO: 0.07 THOUSANDS/ÂΜL (ref 0–0.1)
BASOPHILS NFR BLD AUTO: 1 % (ref 0–1)
BILIRUB SERPL-MCNC: 0.56 MG/DL (ref 0.2–1)
BUN SERPL-MCNC: 32 MG/DL (ref 5–25)
CALCIUM ALBUM COR SERPL-MCNC: 9.8 MG/DL (ref 8.3–10.1)
CALCIUM SERPL-MCNC: 9.1 MG/DL (ref 8.3–10.1)
CHLORIDE SERPL-SCNC: 97 MMOL/L (ref 96–108)
CO2 SERPL-SCNC: 27 MMOL/L (ref 21–32)
CREAT SERPL-MCNC: 1.32 MG/DL (ref 0.6–1.3)
EOSINOPHIL # BLD AUTO: 0.1 THOUSAND/ÂΜL (ref 0–0.61)
EOSINOPHIL NFR BLD AUTO: 1 % (ref 0–6)
ERYTHROCYTE [DISTWIDTH] IN BLOOD BY AUTOMATED COUNT: 18.6 % (ref 11.6–15.1)
GFR SERPL CREATININE-BSD FRML MDRD: 45 ML/MIN/1.73SQ M
GLUCOSE SERPL-MCNC: 374 MG/DL (ref 65–140)
HCT VFR BLD AUTO: 37.7 % (ref 36.5–49.3)
HGB BLD-MCNC: 11.6 G/DL (ref 12–17)
IMM GRANULOCYTES # BLD AUTO: 0.21 THOUSAND/UL (ref 0–0.2)
IMM GRANULOCYTES NFR BLD AUTO: 2 % (ref 0–2)
LYMPHOCYTES # BLD AUTO: 2.37 THOUSANDS/ÂΜL (ref 0.6–4.47)
LYMPHOCYTES NFR BLD AUTO: 18 % (ref 14–44)
MCH RBC QN AUTO: 26.1 PG (ref 26.8–34.3)
MCHC RBC AUTO-ENTMCNC: 30.8 G/DL (ref 31.4–37.4)
MCV RBC AUTO: 85 FL (ref 82–98)
MONOCYTES # BLD AUTO: 0.44 THOUSAND/ÂΜL (ref 0.17–1.22)
MONOCYTES NFR BLD AUTO: 3 % (ref 4–12)
NEUTROPHILS # BLD AUTO: 10.33 THOUSANDS/ÂΜL (ref 1.85–7.62)
NEUTS SEG NFR BLD AUTO: 75 % (ref 43–75)
NRBC BLD AUTO-RTO: 0 /100 WBCS
PLATELET # BLD AUTO: 154 THOUSANDS/UL (ref 149–390)
PMV BLD AUTO: 11.2 FL (ref 8.9–12.7)
POTASSIUM SERPL-SCNC: 4.4 MMOL/L (ref 3.5–5.3)
PROT SERPL-MCNC: 6.7 G/DL (ref 6.4–8.4)
PSA SERPL-MCNC: 0.5 NG/ML (ref 0–4)
RBC # BLD AUTO: 4.45 MILLION/UL (ref 3.88–5.62)
SODIUM SERPL-SCNC: 131 MMOL/L (ref 135–147)
TESTOST SERPL-MCNC: <8 NG/DL (ref 95–948)
WBC # BLD AUTO: 13.52 THOUSAND/UL (ref 4.31–10.16)

## 2022-11-13 ENCOUNTER — HOME CARE VISIT (OUTPATIENT)
Dept: HOME HEALTH SERVICES | Facility: HOME HEALTHCARE | Age: 87
End: 2022-11-13

## 2022-11-13 ENCOUNTER — TELEPHONE (OUTPATIENT)
Dept: OTHER | Facility: OTHER | Age: 87
End: 2022-11-13

## 2022-11-13 NOTE — TELEPHONE ENCOUNTER
Patients condition is worsening and family would like to discuss hospice care  Patient of Dr Sharif Lombardo

## 2022-11-14 ENCOUNTER — HOME CARE VISIT (OUTPATIENT)
Dept: HOME HEALTH SERVICES | Facility: HOME HEALTHCARE | Age: 87
End: 2022-11-14

## 2022-11-14 ENCOUNTER — HOSPICE ADMISSION (OUTPATIENT)
Dept: HOME HOSPICE | Facility: HOSPICE | Age: 87
End: 2022-11-14

## 2022-11-14 ENCOUNTER — HOME CARE VISIT (OUTPATIENT)
Dept: HOME HOSPICE | Facility: HOSPICE | Age: 87
End: 2022-11-14

## 2022-11-14 ENCOUNTER — TELEPHONE (OUTPATIENT)
Dept: HEMATOLOGY ONCOLOGY | Facility: CLINIC | Age: 87
End: 2022-11-14

## 2022-11-14 VITALS
HEIGHT: 66 IN | HEART RATE: 86 BPM | RESPIRATION RATE: 16 BRPM | TEMPERATURE: 97.5 F | BODY MASS INDEX: 28.45 KG/M2 | SYSTOLIC BLOOD PRESSURE: 120 MMHG | WEIGHT: 177 LBS | DIASTOLIC BLOOD PRESSURE: 70 MMHG

## 2022-11-14 DIAGNOSIS — C61 PROSTATE CANCER (HCC): Primary | ICD-10-CM

## 2022-11-14 DIAGNOSIS — E11.65 TYPE 2 DIABETES MELLITUS WITH HYPERGLYCEMIA, WITHOUT LONG-TERM CURRENT USE OF INSULIN (HCC): ICD-10-CM

## 2022-11-14 NOTE — CASE COMMUNICATION
For [facility], [place] [phone]  79 yo male was dxd with adenocarcinoma of the prostate cancer with mets to bladder wall in the Sept  Was started on palliative cancer treatments  With mets to bone and abd lymph nodes at dx  No longer seeking treatment  Very weak, Chronic cheatham for obstruction  Functional decline, multifactorial  (dm, ca ) ECOG 4 PPS 30 RLOC? Approved for RLOC by Dr Mariann Wilson [phone]    Dx prostate cancer c61

## 2022-11-14 NOTE — TELEPHONE ENCOUNTER
I talked with the patient's daughter by phone today  Patient is becoming weaker, developing incontinence, diffuse migratory pain symptoms  Though PSA has substantially decreased, some other process/processes may be contributing to his declined  We reviewed that his diabetes is not well controlled and this is likely a contributor  She has reviewed with the patient and other family members  Hospice is requested  I certainly think that is reasonable and will make arrangements accordingly

## 2022-11-15 ENCOUNTER — HOME CARE VISIT (OUTPATIENT)
Dept: HOME HOSPICE | Facility: HOSPICE | Age: 87
End: 2022-11-15

## 2022-11-15 ENCOUNTER — HOME CARE VISIT (OUTPATIENT)
Dept: HOME HEALTH SERVICES | Facility: HOME HEALTHCARE | Age: 87
End: 2022-11-15

## 2022-11-16 ENCOUNTER — HOME CARE VISIT (OUTPATIENT)
Dept: HOME HOSPICE | Facility: HOSPICE | Age: 87
End: 2022-11-16

## 2022-11-16 ENCOUNTER — HOME CARE VISIT (OUTPATIENT)
Dept: HOME HEALTH SERVICES | Facility: HOME HEALTHCARE | Age: 87
End: 2022-11-16

## 2022-11-16 VITALS
HEART RATE: 76 BPM | DIASTOLIC BLOOD PRESSURE: 60 MMHG | TEMPERATURE: 98 F | RESPIRATION RATE: 20 BRPM | SYSTOLIC BLOOD PRESSURE: 120 MMHG

## 2022-11-17 ENCOUNTER — HOME CARE VISIT (OUTPATIENT)
Dept: HOME HOSPICE | Facility: HOSPICE | Age: 87
End: 2022-11-17

## 2022-11-17 ENCOUNTER — HOME CARE VISIT (OUTPATIENT)
Dept: HOME HEALTH SERVICES | Facility: HOME HEALTHCARE | Age: 87
End: 2022-11-17

## 2022-11-17 NOTE — HOSPICE
Patient is a 80year old man   to wife Giovanni Wells for 64 years  They met while both in the [facility]  patient was in the [employer] and in [place]  Patient retirned from [employer] [facility] [employer] after over 20 years then worked in a paper [id number]   Patient and wife have four children oldest is Raphael William  Patient and wife moved in with Raphael William 2022  Patient and wife have daughter Bria Alicia who lives [place], son Kendell Degroot in [place] and son Lissa Heath in [place]  Daughter Raphael William is POA, copy requested  Patient and wife enjoyed going on trips once their children were grwon, they were also very active in their Orthodoxy  Patient is able to verbalize understanding of disease process and terminal diagnosis, he becomes tear talking about leaving his family behind but also wants to enjoy the time he has with this family  Patient would like Manishy certificate of appreciation  Patient has a private hire Cg, which can be increased when patient needs more help  Patient has  plans with sigrid joiner  Daughter Janette Hand is a , she states that she is supproted by her daughter Ketan Orourke who is an RN and lives on the property  Raphael William also works part time at [employer] which she loves  Patient and wife and evans's goals are for patient to spend time with family and remain at home until TOD  Patient does not want to return to the hospital no cpr no mechanical respirations comfort only

## 2022-11-18 ENCOUNTER — HOME CARE VISIT (OUTPATIENT)
Dept: HOME HOSPICE | Facility: HOSPICE | Age: 87
End: 2022-11-18

## 2022-11-18 ENCOUNTER — PATIENT OUTREACH (OUTPATIENT)
Dept: CASE MANAGEMENT | Facility: OTHER | Age: 87
End: 2022-11-18

## 2022-11-21 ENCOUNTER — HOME CARE VISIT (OUTPATIENT)
Dept: HOME HOSPICE | Facility: HOSPICE | Age: 87
End: 2022-11-21

## 2022-11-22 ENCOUNTER — HOME CARE VISIT (OUTPATIENT)
Dept: HOME HEALTH SERVICES | Facility: HOME HEALTHCARE | Age: 87
End: 2022-11-22

## 2022-11-23 ENCOUNTER — HOME CARE VISIT (OUTPATIENT)
Dept: HOME HEALTH SERVICES | Facility: HOME HEALTHCARE | Age: 87
End: 2022-11-23

## 2022-11-23 ENCOUNTER — HOME CARE VISIT (OUTPATIENT)
Dept: HOME HOSPICE | Facility: HOSPICE | Age: 87
End: 2022-11-23

## 2022-11-23 VITALS
TEMPERATURE: 97 F | DIASTOLIC BLOOD PRESSURE: 64 MMHG | HEART RATE: 80 BPM | RESPIRATION RATE: 20 BRPM | SYSTOLIC BLOOD PRESSURE: 116 MMHG

## 2022-11-25 ENCOUNTER — HOME CARE VISIT (OUTPATIENT)
Dept: HOME HOSPICE | Facility: HOSPICE | Age: 87
End: 2022-11-25

## 2022-11-25 ENCOUNTER — HOME CARE VISIT (OUTPATIENT)
Dept: HOME HEALTH SERVICES | Facility: HOME HEALTHCARE | Age: 87
End: 2022-11-25

## 2022-11-28 ENCOUNTER — HOME CARE VISIT (OUTPATIENT)
Dept: HOME HOSPICE | Facility: HOSPICE | Age: 87
End: 2022-11-28

## 2022-11-28 ENCOUNTER — HOME CARE VISIT (OUTPATIENT)
Dept: HOME HEALTH SERVICES | Facility: HOME HEALTHCARE | Age: 87
End: 2022-11-28

## 2022-11-28 VITALS — RESPIRATION RATE: 16 BRPM | DIASTOLIC BLOOD PRESSURE: 60 MMHG | HEART RATE: 72 BPM | SYSTOLIC BLOOD PRESSURE: 112 MMHG

## 2022-11-29 ENCOUNTER — HOME CARE VISIT (OUTPATIENT)
Dept: HOME HOSPICE | Facility: HOSPICE | Age: 87
End: 2022-11-29

## 2022-11-30 ENCOUNTER — HOME CARE VISIT (OUTPATIENT)
Dept: HOME HEALTH SERVICES | Facility: HOME HEALTHCARE | Age: 87
End: 2022-11-30

## 2022-12-01 ENCOUNTER — HOME CARE VISIT (OUTPATIENT)
Dept: HOME HEALTH SERVICES | Facility: HOME HEALTHCARE | Age: 87
End: 2022-12-01

## 2022-12-01 ENCOUNTER — HOME CARE VISIT (OUTPATIENT)
Dept: HOME HOSPICE | Facility: HOSPICE | Age: 87
End: 2022-12-01

## 2022-12-02 ENCOUNTER — HOME CARE VISIT (OUTPATIENT)
Dept: HOME HOSPICE | Facility: HOSPICE | Age: 87
End: 2022-12-02

## 2022-12-03 ENCOUNTER — HOME CARE VISIT (OUTPATIENT)
Dept: HOME HOSPICE | Facility: HOSPICE | Age: 87
End: 2022-12-03

## 2022-12-05 ENCOUNTER — HOME CARE VISIT (OUTPATIENT)
Dept: HOME HEALTH SERVICES | Facility: HOME HEALTHCARE | Age: 87
End: 2022-12-05

## 2022-12-06 ENCOUNTER — HOME CARE VISIT (OUTPATIENT)
Dept: HOME HOSPICE | Facility: HOSPICE | Age: 87
End: 2022-12-06

## 2022-12-06 ENCOUNTER — HOME CARE VISIT (OUTPATIENT)
Dept: HOME HEALTH SERVICES | Facility: HOME HEALTHCARE | Age: 87
End: 2022-12-06

## 2022-12-07 ENCOUNTER — HOME CARE VISIT (OUTPATIENT)
Dept: HOME HEALTH SERVICES | Facility: HOME HEALTHCARE | Age: 87
End: 2022-12-07

## 2022-12-07 VITALS
RESPIRATION RATE: 20 BRPM | HEART RATE: 88 BPM | TEMPERATURE: 97.7 F | SYSTOLIC BLOOD PRESSURE: 140 MMHG | DIASTOLIC BLOOD PRESSURE: 58 MMHG

## 2022-12-08 ENCOUNTER — HOME CARE VISIT (OUTPATIENT)
Dept: HOME HOSPICE | Facility: HOSPICE | Age: 87
End: 2022-12-08

## 2022-12-08 ENCOUNTER — HOME CARE VISIT (OUTPATIENT)
Dept: HOME HEALTH SERVICES | Facility: HOME HEALTHCARE | Age: 87
End: 2022-12-08

## 2022-12-09 ENCOUNTER — HOME CARE VISIT (OUTPATIENT)
Dept: HOME HOSPICE | Facility: HOSPICE | Age: 87
End: 2022-12-09

## 2022-12-13 ENCOUNTER — HOME CARE VISIT (OUTPATIENT)
Dept: HOME HEALTH SERVICES | Facility: HOME HEALTHCARE | Age: 87
End: 2022-12-13

## 2022-12-13 ENCOUNTER — HOME CARE VISIT (OUTPATIENT)
Dept: HOME HOSPICE | Facility: HOSPICE | Age: 87
End: 2022-12-13

## 2022-12-13 VITALS — RESPIRATION RATE: 20 BRPM

## 2022-12-15 ENCOUNTER — HOME CARE VISIT (OUTPATIENT)
Dept: HOME HEALTH SERVICES | Facility: HOME HEALTHCARE | Age: 87
End: 2022-12-15

## 2022-12-16 ENCOUNTER — HOME CARE VISIT (OUTPATIENT)
Dept: HOME HEALTH SERVICES | Facility: HOME HEALTHCARE | Age: 87
End: 2022-12-16

## 2022-12-17 ENCOUNTER — HOME CARE VISIT (OUTPATIENT)
Dept: HOME HOSPICE | Facility: HOSPICE | Age: 87
End: 2022-12-17

## 2022-12-20 ENCOUNTER — HOME CARE VISIT (OUTPATIENT)
Dept: HOME HEALTH SERVICES | Facility: HOME HEALTHCARE | Age: 87
End: 2022-12-20

## 2022-12-20 ENCOUNTER — HOME CARE VISIT (OUTPATIENT)
Dept: HOME HOSPICE | Facility: HOSPICE | Age: 87
End: 2022-12-20

## 2022-12-21 ENCOUNTER — HOME CARE VISIT (OUTPATIENT)
Dept: HOME HEALTH SERVICES | Facility: HOME HEALTHCARE | Age: 87
End: 2022-12-21

## 2022-12-21 VITALS
HEART RATE: 84 BPM | SYSTOLIC BLOOD PRESSURE: 126 MMHG | RESPIRATION RATE: 20 BRPM | DIASTOLIC BLOOD PRESSURE: 60 MMHG | TEMPERATURE: 96.8 F

## 2022-12-22 ENCOUNTER — HOME CARE VISIT (OUTPATIENT)
Dept: HOME HOSPICE | Facility: HOSPICE | Age: 87
End: 2022-12-22

## 2022-12-23 ENCOUNTER — HOME CARE VISIT (OUTPATIENT)
Dept: HOME HEALTH SERVICES | Facility: HOME HEALTHCARE | Age: 87
End: 2022-12-23

## 2022-12-24 ENCOUNTER — HOME CARE VISIT (OUTPATIENT)
Dept: HOME HOSPICE | Facility: HOSPICE | Age: 87
End: 2022-12-24

## 2022-12-25 ENCOUNTER — HOME CARE VISIT (OUTPATIENT)
Dept: HOME HOSPICE | Facility: HOSPICE | Age: 87
End: 2022-12-25

## 2022-12-27 ENCOUNTER — HOME CARE VISIT (OUTPATIENT)
Dept: HOME HEALTH SERVICES | Facility: HOME HEALTHCARE | Age: 87
End: 2022-12-27

## 2022-12-28 ENCOUNTER — HOME CARE VISIT (OUTPATIENT)
Dept: HOME HOSPICE | Facility: HOSPICE | Age: 87
End: 2022-12-28

## 2022-12-28 ENCOUNTER — HOME CARE VISIT (OUTPATIENT)
Dept: HOME HEALTH SERVICES | Facility: HOME HEALTHCARE | Age: 87
End: 2022-12-28

## 2022-12-28 VITALS
HEART RATE: 72 BPM | SYSTOLIC BLOOD PRESSURE: 104 MMHG | TEMPERATURE: 98 F | DIASTOLIC BLOOD PRESSURE: 54 MMHG | RESPIRATION RATE: 20 BRPM

## 2022-12-29 ENCOUNTER — HOME CARE VISIT (OUTPATIENT)
Dept: HOME HOSPICE | Facility: HOSPICE | Age: 87
End: 2022-12-29

## 2022-12-29 ENCOUNTER — HOME CARE VISIT (OUTPATIENT)
Dept: HOME HEALTH SERVICES | Facility: HOME HEALTHCARE | Age: 87
End: 2022-12-29

## 2023-01-01 ENCOUNTER — HOME CARE VISIT (OUTPATIENT)
Dept: HOME HEALTH SERVICES | Facility: HOME HEALTHCARE | Age: 88
End: 2023-01-01

## 2023-01-01 ENCOUNTER — HOME CARE VISIT (OUTPATIENT)
Dept: HOME HOSPICE | Facility: HOSPICE | Age: 88
End: 2023-01-01

## 2023-01-01 VITALS
HEART RATE: 88 BPM | DIASTOLIC BLOOD PRESSURE: 62 MMHG | RESPIRATION RATE: 28 BRPM | SYSTOLIC BLOOD PRESSURE: 124 MMHG | TEMPERATURE: 97.4 F

## 2023-01-03 ENCOUNTER — HOME CARE VISIT (OUTPATIENT)
Dept: HOME HOSPICE | Facility: HOSPICE | Age: 88
End: 2023-01-03

## 2023-01-03 ENCOUNTER — HOME CARE VISIT (OUTPATIENT)
Dept: HOME HEALTH SERVICES | Facility: HOME HEALTHCARE | Age: 88
End: 2023-01-03

## 2023-01-03 VITALS
SYSTOLIC BLOOD PRESSURE: 128 MMHG | HEART RATE: 76 BPM | DIASTOLIC BLOOD PRESSURE: 54 MMHG | TEMPERATURE: 98.1 F | RESPIRATION RATE: 20 BRPM

## 2023-01-04 ENCOUNTER — HOME CARE VISIT (OUTPATIENT)
Dept: HOME HOSPICE | Facility: HOSPICE | Age: 88
End: 2023-01-04

## 2023-01-04 ENCOUNTER — HOME CARE VISIT (OUTPATIENT)
Dept: HOME HEALTH SERVICES | Facility: HOME HEALTHCARE | Age: 88
End: 2023-01-04

## 2023-01-04 VITALS
HEART RATE: 80 BPM | DIASTOLIC BLOOD PRESSURE: 66 MMHG | TEMPERATURE: 98.2 F | RESPIRATION RATE: 22 BRPM | SYSTOLIC BLOOD PRESSURE: 148 MMHG

## 2023-01-05 ENCOUNTER — HOME CARE VISIT (OUTPATIENT)
Dept: HOME HEALTH SERVICES | Facility: HOME HEALTHCARE | Age: 88
End: 2023-01-05

## 2023-01-06 ENCOUNTER — HOME CARE VISIT (OUTPATIENT)
Dept: HOME HEALTH SERVICES | Facility: HOME HEALTHCARE | Age: 88
End: 2023-01-06

## 2023-01-07 ENCOUNTER — HOME CARE VISIT (OUTPATIENT)
Dept: HOME HOSPICE | Facility: HOSPICE | Age: 88
End: 2023-01-07

## 2023-01-19 ENCOUNTER — HOME CARE VISIT (OUTPATIENT)
Dept: HOME HOSPICE | Facility: HOSPICE | Age: 88
End: 2023-01-19

## 2023-01-19 NOTE — CASE COMMUNICATION
Nicolas ESPINOSA  Polol[place]a, Bereavement Final IDG [id number] ([id number], 1MR) Due: 23   : 1927  SOC: 22  DOD: 1/10/23  Diagnosis: Prostate Cancer  Primary: Wife - Katerine Devlalle was a 80year old man   to wife [facility] for 64 years  [employer]   Four children  Shayna Peterson, and Von Osborn moved in with Von 2022  They were very active in their Shinto, [employer]  Bentley Nielsen is  a   Voiced support from her daughter Diego Zarate who is a nurse and lives on the property  Bentley Nielsen also works part time at [employer]  Bentley Nielsen struggled with her father being so demanding  She stated when she had breast cancer her parents sobbed  She feels reason they sobbed was because they knew no other child would take them in  Shared feeling taken advantage of  [facility] is depressed  She began seeing a therapist and Laura Maddox was ups et she did not divulge what she discussed with her therapist  [facility] struggled with Nicolas's disrespect and said he was condescending his entire life  [facility] also has her own health issues  Diego Zarate also said her grandfather's behavior was unacceptable  She would come over to provide care in order to give her mother a break  Laura Maddox stated his "traditional views" of women being the weaker sex and things needing to go through him  Laura Maddox had stated  Von has "done amazing for a woman"  Other family visited, including a 13year old grandson, Francoise Jimenez  TOD: Family declined support services at TO  CC: MSW [place] made a condolence call to Bentley Nielsen  Family was grieving/coping appropriately  Mitzy West offered condolences  Visit offer was appreciated, but declined  Call Assignments:  Ricardo Nayak to reassess wife Margo Crespo at   (Due: 23)  Jazmyne Bose to reassess daughter Souleymane Muniz at [facility]   (Due: 23)
